# Patient Record
Sex: FEMALE | Race: WHITE | NOT HISPANIC OR LATINO | URBAN - METROPOLITAN AREA
[De-identification: names, ages, dates, MRNs, and addresses within clinical notes are randomized per-mention and may not be internally consistent; named-entity substitution may affect disease eponyms.]

---

## 2019-04-08 ENCOUNTER — HISTORICAL (OUTPATIENT)
Dept: ADMINISTRATIVE | Facility: HOSPITAL | Age: 29
End: 2019-04-08

## 2019-04-08 LAB
ABS NEUT (OLG): 9.82 X10(3)/MCL (ref 2.1–9.2)
AMPHET UR QL SCN: NORMAL
BARBITURATE SCN PRESENT UR: NORMAL
BASOPHILS # BLD AUTO: 0 X10(3)/MCL (ref 0–0.2)
BASOPHILS NFR BLD AUTO: 0 %
BENZODIAZ UR QL SCN: NORMAL
CANNABINOIDS UR QL SCN: NORMAL
COCAINE UR QL SCN: NORMAL
EOSINOPHIL # BLD AUTO: 0.4 X10(3)/MCL (ref 0–0.9)
EOSINOPHIL NFR BLD AUTO: 3 %
ERYTHROCYTE [DISTWIDTH] IN BLOOD BY AUTOMATED COUNT: 11.9 % (ref 11.5–17)
GROUP & RH: NORMAL
HBV SURFACE AG SERPL QL IA: NEGATIVE
HCT VFR BLD AUTO: 33.5 % (ref 37–47)
HCV AB SERPL QL IA: NEGATIVE
HGB BLD-MCNC: 11.2 GM/DL (ref 12–16)
HIV 1+2 AB+HIV1 P24 AG SERPL QL IA: NEGATIVE
LYMPHOCYTES # BLD AUTO: 1.1 X10(3)/MCL (ref 0.6–4.6)
LYMPHOCYTES NFR BLD AUTO: 9 %
MCH RBC QN AUTO: 30.7 PG (ref 27–31)
MCHC RBC AUTO-ENTMCNC: 33.4 GM/DL (ref 33–36)
MCV RBC AUTO: 91.8 FL (ref 80–94)
MONOCYTES # BLD AUTO: 0.6 X10(3)/MCL (ref 0.1–1.3)
MONOCYTES NFR BLD AUTO: 5 %
NEUTROPHILS # BLD AUTO: 9.82 X10(3)/MCL (ref 2.1–9.2)
NEUTROPHILS NFR BLD AUTO: 81 %
OPIATES UR QL SCN: NORMAL
PCP UR QL: NORMAL
PH UR STRIP.AUTO: NORMAL [PH] (ref 5–7.5)
PLATELET # BLD AUTO: 149 X10(3)/MCL (ref 130–400)
PMV BLD AUTO: 11.4 FL (ref 9.4–12.4)
RBC # BLD AUTO: 3.65 X10(6)/MCL (ref 4.2–5.4)
T PALLIDUM AB SER QL: NORMAL
T4 FREE SERPL-MCNC: 0.87 NG/DL (ref 0.76–1.46)
TSH SERPL-ACNC: 1.44 MIU/L (ref 0.36–3.74)
WBC # SPEC AUTO: 12.1 X10(3)/MCL (ref 4.5–11.5)

## 2019-05-07 ENCOUNTER — HISTORICAL (OUTPATIENT)
Dept: ADMINISTRATIVE | Facility: HOSPITAL | Age: 29
End: 2019-05-07

## 2019-05-07 LAB
AMPHET UR QL SCN: NORMAL
BARBITURATE SCN PRESENT UR: NORMAL
BENZODIAZ UR QL SCN: NORMAL
CANNABINOIDS UR QL SCN: NORMAL
COCAINE UR QL SCN: NORMAL
GLUCOSE 1H P 100 G GLC PO SERPL-MCNC: 113 MG/DL (ref 100–180)
OPIATES UR QL SCN: NORMAL
PCP UR QL: NORMAL
PH UR STRIP.AUTO: 7 [PH] (ref 5–7.5)
SP GR FLD REFRACTOMETRY: 1.02 (ref 1–1.03)

## 2019-07-23 ENCOUNTER — HISTORICAL (OUTPATIENT)
Dept: ADMINISTRATIVE | Facility: HOSPITAL | Age: 29
End: 2019-07-23

## 2019-07-23 LAB
ALT SERPL-CCNC: 15 UNIT/L (ref 12–78)
AST SERPL-CCNC: 10 UNIT/L (ref 15–37)
ERYTHROCYTE [DISTWIDTH] IN BLOOD BY AUTOMATED COUNT: 11.9 % (ref 11.5–17)
GLUCOSE 1H P 100 G GLC PO SERPL-MCNC: 91 MG/DL (ref 100–180)
HCT VFR BLD AUTO: 32.7 % (ref 37–47)
HGB BLD-MCNC: 10.9 GM/DL (ref 12–16)
LDH SERPL-CCNC: 125 UNIT/L (ref 84–246)
MCH RBC QN AUTO: 29.7 PG (ref 27–31)
MCHC RBC AUTO-ENTMCNC: 33.3 GM/DL (ref 33–36)
MCV RBC AUTO: 89.1 FL (ref 80–94)
PLATELET # BLD AUTO: 199 X10(3)/MCL (ref 130–400)
PMV BLD AUTO: 11 FL (ref 9.4–12.4)
RBC # BLD AUTO: 3.67 X10(6)/MCL (ref 4.2–5.4)
URATE SERPL-MCNC: 3.3 MG/DL (ref 2.6–7.2)
WBC # SPEC AUTO: 12.7 X10(3)/MCL (ref 4.5–11.5)

## 2019-08-07 ENCOUNTER — HOSPITAL ENCOUNTER (OUTPATIENT)
Dept: OBSTETRICS AND GYNECOLOGY | Facility: HOSPITAL | Age: 29
End: 2019-08-07
Attending: OBSTETRICS & GYNECOLOGY | Admitting: OBSTETRICS & GYNECOLOGY

## 2019-08-07 LAB
ABS NEUT (OLG): 10.51 X10(3)/MCL (ref 2.1–9.2)
APPEARANCE, UA: CLEAR
BACTERIA SPEC CULT: NORMAL /HPF
BASOPHILS # BLD AUTO: 0 X10(3)/MCL (ref 0–0.2)
BASOPHILS NFR BLD AUTO: 0 %
BILIRUB UR QL STRIP: NEGATIVE
COLOR UR: YELLOW
EOSINOPHIL # BLD AUTO: 0.2 X10(3)/MCL (ref 0–0.9)
EOSINOPHIL NFR BLD AUTO: 1 %
ERYTHROCYTE [DISTWIDTH] IN BLOOD BY AUTOMATED COUNT: 12.1 % (ref 11.5–17)
FETAL FIBRONECTIN (OHS): NEGATIVE
GLUCOSE (UA): NEGATIVE
GROUP & RH: NORMAL
HCT VFR BLD AUTO: 32.5 % (ref 37–47)
HGB BLD-MCNC: 10.9 GM/DL (ref 12–16)
HGB UR QL STRIP: NEGATIVE
KETONES UR QL STRIP: NEGATIVE
LEUKOCYTE ESTERASE UR QL STRIP: NEGATIVE
LYMPHOCYTES # BLD AUTO: 2.3 X10(3)/MCL (ref 0.6–4.6)
LYMPHOCYTES NFR BLD AUTO: 17 %
MCH RBC QN AUTO: 28.8 PG (ref 27–31)
MCHC RBC AUTO-ENTMCNC: 33.5 GM/DL (ref 33–36)
MCV RBC AUTO: 86 FL (ref 80–94)
MONOCYTES # BLD AUTO: 0.5 X10(3)/MCL (ref 0.1–1.3)
MONOCYTES NFR BLD AUTO: 4 %
NEUTROPHILS # BLD AUTO: 10.51 X10(3)/MCL (ref 2.1–9.2)
NEUTROPHILS NFR BLD AUTO: 77 %
NITRITE UR QL STRIP: NEGATIVE
PH UR STRIP: 6.5 [PH] (ref 5–9)
PLATELET # BLD AUTO: 233 X10(3)/MCL (ref 130–400)
PMV BLD AUTO: 11.9 FL (ref 9.4–12.4)
PROT UR QL STRIP: NEGATIVE
RBC # BLD AUTO: 3.78 X10(6)/MCL (ref 4.2–5.4)
RBC #/AREA URNS HPF: NORMAL /[HPF]
SP GR UR STRIP: 1.01 (ref 1–1.03)
SQUAMOUS EPITHELIAL, UA: NORMAL
T PALLIDUM AB SER QL: NORMAL
UROBILINOGEN UR STRIP-ACNC: 0.2
WBC # SPEC AUTO: 13.7 X10(3)/MCL (ref 4.5–11.5)
WBC #/AREA URNS HPF: NORMAL /[HPF]

## 2019-08-08 ENCOUNTER — HISTORICAL (OUTPATIENT)
Dept: OBSTETRICS AND GYNECOLOGY | Facility: HOSPITAL | Age: 29
End: 2019-08-08

## 2019-09-18 ENCOUNTER — HISTORICAL (OUTPATIENT)
Dept: ADMINISTRATIVE | Facility: HOSPITAL | Age: 29
End: 2019-09-18

## 2019-09-18 ENCOUNTER — HISTORICAL (OUTPATIENT)
Dept: LAB | Facility: HOSPITAL | Age: 29
End: 2019-09-18

## 2019-09-18 LAB
ALBUMIN SERPL-MCNC: 2.6 GM/DL (ref 3.4–5)
ALBUMIN/GLOB SERPL: 0.8 {RATIO}
ALP SERPL-CCNC: 159 UNIT/L (ref 38–126)
ALT SERPL-CCNC: 14 UNIT/L (ref 12–78)
AST SERPL-CCNC: 9 UNIT/L (ref 15–37)
BILIRUB SERPL-MCNC: 0.5 MG/DL (ref 0.2–1)
BILIRUBIN DIRECT+TOT PNL SERPL-MCNC: 0.1 MG/DL (ref 0–0.2)
BILIRUBIN DIRECT+TOT PNL SERPL-MCNC: 0.4 MG/DL (ref 0–0.8)
BUN SERPL-MCNC: 7 MG/DL (ref 7–18)
CALCIUM SERPL-MCNC: 9.2 MG/DL (ref 8.5–10.1)
CHLORIDE SERPL-SCNC: 104 MMOL/L (ref 98–107)
CO2 SERPL-SCNC: 26 MMOL/L (ref 21–32)
CREAT SERPL-MCNC: 0.57 MG/DL (ref 0.55–1.02)
GLOBULIN SER-MCNC: 3.2 GM/DL (ref 2.4–3.5)
GLUCOSE SERPL-MCNC: 129 MG/DL (ref 74–106)
LDH SERPL-CCNC: 150 UNIT/L (ref 84–246)
POTASSIUM SERPL-SCNC: 3.9 MMOL/L (ref 3.5–5.1)
PROT SERPL-MCNC: 5.8 GM/DL (ref 6.4–8.2)
SODIUM SERPL-SCNC: 138 MMOL/L (ref 136–145)
URATE SERPL-MCNC: 3.3 MG/DL (ref 2.6–7.2)

## 2019-09-20 LAB — FINAL CULTURE: NORMAL

## 2019-09-27 ENCOUNTER — HISTORICAL (OUTPATIENT)
Dept: ADMINISTRATIVE | Facility: HOSPITAL | Age: 29
End: 2019-09-27

## 2019-09-27 LAB
ABS NEUT (OLG): 9.3 X10(3)/MCL (ref 2.1–9.2)
BASOPHILS # BLD AUTO: 0 X10(3)/MCL (ref 0–0.2)
BASOPHILS NFR BLD AUTO: 0 %
EOSINOPHIL # BLD AUTO: 0.2 X10(3)/MCL (ref 0–0.9)
EOSINOPHIL NFR BLD AUTO: 2 %
ERYTHROCYTE [DISTWIDTH] IN BLOOD BY AUTOMATED COUNT: 12.7 % (ref 11.5–17)
HCT VFR BLD AUTO: 32.6 % (ref 37–47)
HGB BLD-MCNC: 10.3 GM/DL (ref 12–16)
HIV 1+2 AB+HIV1 P24 AG SERPL QL IA: NEGATIVE
IMM GRANULOCYTES # BLD AUTO: 0 10*3/UL
IMM GRANULOCYTES NFR BLD AUTO: 2 %
LYMPHOCYTES # BLD AUTO: 2.2 X10(3)/MCL (ref 0.6–4.6)
LYMPHOCYTES NFR BLD AUTO: 17 %
MCH RBC QN AUTO: 25.9 PG (ref 27–31)
MCHC RBC AUTO-ENTMCNC: 31.6 GM/DL (ref 33–36)
MCV RBC AUTO: 81.9 FL (ref 80–94)
MONOCYTES # BLD AUTO: 0.6 X10(3)/MCL (ref 0.1–1.3)
MONOCYTES NFR BLD AUTO: 5 %
NEUTROPHILS # BLD AUTO: 9.3 X10(3)/MCL (ref 2.1–9.2)
NEUTROPHILS NFR BLD AUTO: 74 %
PLATELET # BLD AUTO: 179 X10(3)/MCL (ref 130–400)
PMV BLD AUTO: 13.2 FL (ref 9.4–12.4)
RBC # BLD AUTO: 3.98 X10(6)/MCL (ref 4.2–5.4)
T PALLIDUM AB SER QL: NORMAL
WBC # SPEC AUTO: 12.6 X10(3)/MCL (ref 4.5–11.5)

## 2019-11-06 LAB — POC BETA-HCG (QUAL): NEGATIVE

## 2020-03-04 LAB — POC BETA-HCG (QUAL): NEGATIVE

## 2021-11-16 ENCOUNTER — HISTORICAL (OUTPATIENT)
Dept: ADMINISTRATIVE | Facility: HOSPITAL | Age: 31
End: 2021-11-16

## 2021-11-16 LAB
CHLORIDE SERPL-SCNC: 104 MMOL/L (ref 98–107)
CORTIS SERPL-SCNC: 6.3 UG/DL
POTASSIUM SERPL-SCNC: 4 MMOL/L (ref 3.5–5.1)
SODIUM SERPL-SCNC: 139 MMOL/L (ref 136–145)

## 2021-12-03 ENCOUNTER — HISTORICAL (OUTPATIENT)
Dept: ADMINISTRATIVE | Facility: HOSPITAL | Age: 31
End: 2021-12-03

## 2021-12-16 ENCOUNTER — HISTORICAL (OUTPATIENT)
Dept: ADMINISTRATIVE | Facility: HOSPITAL | Age: 31
End: 2021-12-16

## 2022-04-10 ENCOUNTER — HISTORICAL (OUTPATIENT)
Dept: ADMINISTRATIVE | Facility: HOSPITAL | Age: 32
End: 2022-04-10

## 2022-04-27 VITALS
SYSTOLIC BLOOD PRESSURE: 118 MMHG | DIASTOLIC BLOOD PRESSURE: 70 MMHG | WEIGHT: 150 LBS | HEIGHT: 64 IN | BODY MASS INDEX: 25.61 KG/M2

## 2022-09-16 ENCOUNTER — HISTORICAL (OUTPATIENT)
Dept: ADMINISTRATIVE | Facility: HOSPITAL | Age: 32
End: 2022-09-16

## 2022-09-17 ENCOUNTER — HISTORICAL (OUTPATIENT)
Dept: ADMINISTRATIVE | Facility: HOSPITAL | Age: 32
End: 2022-09-17

## 2023-10-20 DIAGNOSIS — H53.8 BLURRY VISION: Primary | ICD-10-CM

## 2023-10-20 DIAGNOSIS — F07.81 POSTCONCUSSION SYNDROME: ICD-10-CM

## 2023-12-06 ENCOUNTER — OFFICE VISIT (OUTPATIENT)
Dept: OPHTHALMOLOGY | Facility: CLINIC | Age: 33
End: 2023-12-06
Payer: MEDICAID

## 2023-12-06 VITALS — BODY MASS INDEX: 25.6 KG/M2 | HEIGHT: 64 IN | WEIGHT: 149.94 LBS

## 2023-12-06 DIAGNOSIS — H54.3 VISION LOSS, BILATERAL: Primary | ICD-10-CM

## 2023-12-06 DIAGNOSIS — F07.81 POSTCONCUSSION SYNDROME: ICD-10-CM

## 2023-12-06 PROCEDURE — 92133 CPTRZD OPH DX IMG PST SGM ON: CPT | Mod: PBBFAC,PN | Performed by: OPHTHALMOLOGY

## 2023-12-06 PROCEDURE — 99213 OFFICE O/P EST LOW 20 MIN: CPT | Mod: PBBFAC,PN | Performed by: STUDENT IN AN ORGANIZED HEALTH CARE EDUCATION/TRAINING PROGRAM

## 2023-12-06 PROCEDURE — 92250 FUNDUS PHOTOGRAPHY W/I&R: CPT | Mod: 59,PBBFAC,PN | Performed by: STUDENT IN AN ORGANIZED HEALTH CARE EDUCATION/TRAINING PROGRAM

## 2023-12-06 PROCEDURE — 92134 CPTRZ OPH DX IMG PST SGM RTA: CPT | Mod: PBBFAC,PN | Performed by: OPHTHALMOLOGY

## 2023-12-06 PROCEDURE — 92133 CPTRZD OPH DX IMG PST SGM ON: CPT | Mod: PBBFAC,PN | Performed by: STUDENT IN AN ORGANIZED HEALTH CARE EDUCATION/TRAINING PROGRAM

## 2023-12-06 PROCEDURE — 92134 CPTRZ OPH DX IMG PST SGM RTA: CPT | Mod: PBBFAC,PN | Performed by: STUDENT IN AN ORGANIZED HEALTH CARE EDUCATION/TRAINING PROGRAM

## 2023-12-06 RX ORDER — RIZATRIPTAN BENZOATE 10 MG/1
10 TABLET, ORALLY DISINTEGRATING ORAL DAILY PRN
COMMUNITY
Start: 2023-10-13

## 2023-12-06 RX ORDER — NORETHINDRONE 5 MG/1
5 TABLET ORAL EVERY MORNING
COMMUNITY
Start: 2023-07-26

## 2023-12-06 RX ORDER — TERBINAFINE HYDROCHLORIDE 250 MG/1
250 TABLET ORAL EVERY MORNING
COMMUNITY
Start: 2023-06-20

## 2023-12-06 RX ORDER — ONDANSETRON 4 MG/1
4 TABLET, ORALLY DISINTEGRATING ORAL EVERY 8 HOURS PRN
COMMUNITY
Start: 2023-10-13

## 2023-12-06 RX ORDER — NADOLOL 20 MG/1
20 TABLET ORAL
COMMUNITY
Start: 2023-08-23

## 2023-12-06 RX ORDER — HYDROCODONE BITARTRATE AND ACETAMINOPHEN 5; 325 MG/1; MG/1
1 TABLET ORAL EVERY 6 HOURS PRN
COMMUNITY
Start: 2023-10-10

## 2023-12-06 RX ORDER — METHOCARBAMOL 500 MG/1
500 TABLET, FILM COATED ORAL 4 TIMES DAILY PRN
COMMUNITY
Start: 2023-10-13

## 2023-12-06 RX ORDER — METOPROLOL TARTRATE 25 MG/1
12.5 TABLET, FILM COATED ORAL 2 TIMES DAILY
COMMUNITY
Start: 2023-10-10

## 2023-12-06 RX ORDER — FLUCONAZOLE 150 MG/1
150 TABLET ORAL
COMMUNITY
Start: 2023-11-15

## 2023-12-06 RX ORDER — TRIAMTERENE/HYDROCHLOROTHIAZID 37.5-25 MG
1 TABLET ORAL EVERY MORNING
COMMUNITY

## 2023-12-06 RX ORDER — TRAZODONE HYDROCHLORIDE 50 MG/1
50 TABLET ORAL NIGHTLY
COMMUNITY
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
## 2023-12-07 PROBLEM — H54.3 VISION LOSS, BILATERAL: Status: ACTIVE | Noted: 2023-12-07

## 2023-12-07 PROBLEM — F07.81 POSTCONCUSSION SYNDROME: Status: ACTIVE | Noted: 2023-12-07

## 2024-01-12 ENCOUNTER — CLINICAL SUPPORT (OUTPATIENT)
Dept: OPHTHALMOLOGY | Facility: CLINIC | Age: 34
End: 2024-01-12
Payer: MEDICAID

## 2024-01-12 DIAGNOSIS — R51.9 HEADACHE AROUND THE EYES: Primary | ICD-10-CM

## 2024-01-12 NOTE — PROGRESS NOTES
HPI    Here for HVF OU  Last edited by Jenn Harkins MA on 1/12/2024 10:27 AM.            Assessment /Plan     For exam results, see Encounter Report.    Headache around the eyes  -     Starkey Visual Field - OU - Extended - Both Eyes; Future      Visual field done

## 2024-01-17 NOTE — PROGRESS NOTES
"HPI    Here for HVF OU  Last edited by Jenn Harkins MA on 1/12/2024 10:27 AM.      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    Headache around the eyes  -     Starkey Visual Field - OU - Extended - Both Eyes; Future      1) Vision Loss, OU  - BCVA 20/80 // 20/60   - Patient had a head injury 1/2023 - since that time, has been experiencing migraines, blurred vision, and photophobia. Complaints are vague with patient - states that she cannot see the "whole picture" and things quickly go in and out of focus.   - Attempted MRX (undilated) but patient reported no change from plano to -5.00 sphere. Very difficult MRX. Did not see any improvement with any glasses provided this year (reports 3 different prescriptions). May need retinoscopy in future.   - Anterior exam and dilated exam WNL. OCT nerve and OCT macula WNL 12/6/23.   - VEP performed at Dr. Freeman's office shows delays - need records, requested 12/6/23. VF at [facility] showing bilateral peripheral visual field loss.   - At this time, no identifiable reasons on eye examination. Will review VEP and VF from Dr. Freeman's office as well as obtain VF at our clinic. Could be post concussive vs functional vision loss.   Note: the patient drives on a regular basis. In fact, she drove herself to this appointment from [place].  1/12/2024:The patient presented for a HVF only.  Both VF defects appear to be ring scotomas.  Also the central vision OS was very high.    2) Migraines  - Follows with neurology - had an LP done in 10/2023 with normal opening pressure, normal CSF studies  - MRI Brain and Orbits 10/2023 WNL but MRI brain in 3/2023 with mild chornic microvascular ischemic change in the left lateral ventricle occipital horn  - Autoimmune workup was WNL - " negative parvovirus IgG IgM, negative West Nile virus antibodies, negative paraneoplastic autoantibody panel, normal ESR, negative PARADISE profile, negative NMO IgG antibody, normal thyroid antibodies levels, normal protein electrophoresis, normal homocystine levels, high B12 levels, low vitamin D to 27.3, negative HIV, negative RA factor, negative celiac disease panel, vitamin D, vitamin B6, vitamin B1, zinc, copper and methylmalonic acid levels, negative NT DNA antibody, negative hepatitis panel, negative Sjogren antibodies.   - Currently taking Fioricet and Maxalt for HA and experiences relief    RTC: 3 weeks, review records from [facility] and recheck vision.  Seen with Dr. Urrutia

## 2024-02-01 ENCOUNTER — OFFICE VISIT (OUTPATIENT)
Dept: OPHTHALMOLOGY | Facility: CLINIC | Age: 34
End: 2024-02-01
Payer: MEDICAID

## 2024-02-01 VITALS — HEIGHT: 64 IN | WEIGHT: 149.94 LBS | BODY MASS INDEX: 25.6 KG/M2

## 2024-02-01 DIAGNOSIS — H54.3 VISION LOSS, BILATERAL: Primary | ICD-10-CM

## 2024-02-01 PROCEDURE — 99213 OFFICE O/P EST LOW 20 MIN: CPT | Mod: PBBFAC,PN

## 2024-02-01 NOTE — PROGRESS NOTES
"HPI     Decreased Visual Acuity     Additional comments:  Pt was trying to sew and had to use magnifying   glass to see more clearly. Patient has a 15x magnification mirror and face   is blurry in mirror but her mother has a stronger magnification and   seecould see clearly.     Screens and bright lights make va worse.     Vision is constantly going in and out of focus.      When pt first focuses on something she sees what she's looking at clearly   but within a few seconds va gets blurry. Then an area of vision will come   back into focus but not the whole image.            Comments    Decreased Visual Acuity, Migraines          Last edited by Patrizia Sousa MA on 2/1/2024 10:32 AM.            Assessment /Plan     For exam results, see Encounter Report.    There are no diagnoses linked to this encounter.  1) Vision Loss, OU  - BCVA on 12/2023 20/80 // 20/60   - Patient had a head injury 1/2023 - since that time, has been experiencing migraines, blurred vision, and photophobia. Complaints are vague with patient - states that she cannot see the "whole picture" and things quickly go in and out of focus.   - Attempted MRX (undilated) but patient reported no change from plano to -5.00 sphere. Very difficult MRX. Did not see any improvement with any glasses provided this year (reports 3 different prescriptions). May need retinoscopy in future.   - Anterior exam and dilated exam WNL. OCT nerve and OCT macula WNL 12/6/23.   - VEP performed at Dr. Freeman's office shows delays - need records, requested 12/6/23. VF at [facility] showing bilateral peripheral visual field loss.   - At this time, no identifiable reasons on eye examination. Will review VEP and VF from Dr. Freeman's office as well as obtain VF at our clinic. Could be post concussive vs functional vision loss.   Note: the patient drives on a regular basis. In fact, she drove herself to this appointment from [place].  1/12/2024:The patient presented for a F " only.  Both VF defects appear to be ring scotomas.  Also the central vision OS was very high.   02/01/2024:  Subjectively, appears that vision is worse, but improvement since prior examination (20/50 OU). No APD on examination, no  color desaturation. Discussed case with Dr. Urrutia. Appears to be more consistent with functional vision loss. Discussed with patient possible referral for neuropscyhiatrist, in addition to referral to neuro-ophthalmology in [place] for more sensitive OKSANA/VEP testing. Patient willing to be seen by both specialists.      2) Migraines  - Follows with neurology - had an LP done in 10/2023 with normal opening pressure, normal CSF studies  - MRI Brain and Orbits 10/2023 WNL but MRI brain in 3/2023 with mild chornic microvascular ischemic change in the left lateral ventricle occipital horn  - Autoimmune workup was WNL - negative parvovirus IgG IgM, negative West Nile virus antibodies, negative paraneoplastic autoantibody panel, normal ESR, negative PARADISE profile, negative NMO IgG antibody, normal thyroid antibodies levels, normal protein electrophoresis, normal homocystine levels, high B12 levels, low vitamin D to 27.3, negative HIV, negative RA factor, negative celiac disease panel, vitamin D, vitamin B6, vitamin B1, zinc, copper and methylmalonic acid levels, negative NT DNA antibody, negative hepatitis panel, negative Sjogren antibodies.   - Currently taking Fioricet and Maxalt for HA and experiences relief     RTC: 3 weeks, review records from [facility] and recheck vision.

## 2024-08-23 ENCOUNTER — HOSPITAL ENCOUNTER (OUTPATIENT)
Facility: HOSPITAL | Age: 34
Discharge: HOME | End: 2024-08-25
Attending: STUDENT IN AN ORGANIZED HEALTH CARE EDUCATION/TRAINING PROGRAM | Admitting: SURGERY
Payer: MEDICAID

## 2024-08-23 DIAGNOSIS — K81.9 CHOLECYSTITIS: ICD-10-CM

## 2024-08-23 DIAGNOSIS — R10.13 EPIGASTRIC PAIN: ICD-10-CM

## 2024-08-23 DIAGNOSIS — K80.20 SYMPTOMATIC CHOLELITHIASIS: Primary | ICD-10-CM

## 2024-08-23 LAB
ALBUMIN SERPL-MCNC: 4.2 G/DL (ref 3.5–5)
ALBUMIN/GLOB SERPL: 1.4 RATIO (ref 1.1–2)
ALP SERPL-CCNC: 50 UNIT/L (ref 40–150)
ALT SERPL-CCNC: 12 UNIT/L (ref 0–55)
ANION GAP SERPL CALC-SCNC: 11 MEQ/L
AST SERPL-CCNC: 14 UNIT/L (ref 5–34)
B-HCG UR QL: NEGATIVE
BACTERIA #/AREA URNS AUTO: ABNORMAL /HPF
BASOPHILS # BLD AUTO: 0.05 X10(3)/MCL
BASOPHILS NFR BLD AUTO: 0.5 %
BILIRUB SERPL-MCNC: 0.4 MG/DL
BILIRUB UR QL STRIP.AUTO: NEGATIVE
BUN SERPL-MCNC: 19.7 MG/DL (ref 7–18.7)
CALCIUM SERPL-MCNC: 9.4 MG/DL (ref 8.4–10.2)
CHLORIDE SERPL-SCNC: 98 MMOL/L (ref 98–107)
CLARITY UR: ABNORMAL
CO2 SERPL-SCNC: 28 MMOL/L (ref 22–29)
COLOR UR AUTO: YELLOW
CREAT SERPL-MCNC: 0.89 MG/DL (ref 0.55–1.02)
CREAT/UREA NIT SERPL: 22
EOSINOPHIL # BLD AUTO: 0.23 X10(3)/MCL (ref 0–0.9)
EOSINOPHIL NFR BLD AUTO: 2.2 %
ERYTHROCYTE [DISTWIDTH] IN BLOOD BY AUTOMATED COUNT: 11.8 % (ref 11.5–17)
GFR SERPLBLD CREATININE-BSD FMLA CKD-EPI: >60 ML/MIN/1.73/M2
GLOBULIN SER-MCNC: 3.1 GM/DL (ref 2.4–3.5)
GLUCOSE SERPL-MCNC: 85 MG/DL (ref 74–100)
GLUCOSE UR QL STRIP: NORMAL
HCT VFR BLD AUTO: 40.4 % (ref 37–47)
HGB BLD-MCNC: 14 G/DL (ref 12–16)
HGB UR QL STRIP: ABNORMAL
IMM GRANULOCYTES # BLD AUTO: 0.03 X10(3)/MCL (ref 0–0.04)
IMM GRANULOCYTES NFR BLD AUTO: 0.3 %
KETONES UR QL STRIP: NEGATIVE
LEUKOCYTE ESTERASE UR QL STRIP: NEGATIVE
LIPASE SERPL-CCNC: 25 U/L
LYMPHOCYTES # BLD AUTO: 3.12 X10(3)/MCL (ref 0.6–4.6)
LYMPHOCYTES NFR BLD AUTO: 29.2 %
MCH RBC QN AUTO: 30 PG (ref 27–31)
MCHC RBC AUTO-ENTMCNC: 34.7 G/DL (ref 33–36)
MCV RBC AUTO: 86.5 FL (ref 80–94)
MONOCYTES # BLD AUTO: 0.54 X10(3)/MCL (ref 0.1–1.3)
MONOCYTES NFR BLD AUTO: 5.1 %
MUCOUS THREADS URNS QL MICRO: ABNORMAL /LPF
NEUTROPHILS # BLD AUTO: 6.71 X10(3)/MCL (ref 2.1–9.2)
NEUTROPHILS NFR BLD AUTO: 62.7 %
NITRITE UR QL STRIP: NEGATIVE
NRBC BLD AUTO-RTO: 0 %
PH UR STRIP: 6 [PH]
PLATELET # BLD AUTO: 290 X10(3)/MCL (ref 130–400)
PMV BLD AUTO: 11.4 FL (ref 7.4–10.4)
POTASSIUM SERPL-SCNC: 3.7 MMOL/L (ref 3.5–5.1)
PROT SERPL-MCNC: 7.3 GM/DL (ref 6.4–8.3)
PROT UR QL STRIP: ABNORMAL
RBC # BLD AUTO: 4.67 X10(6)/MCL (ref 4.2–5.4)
RBC #/AREA URNS AUTO: ABNORMAL /HPF
SODIUM SERPL-SCNC: 137 MMOL/L (ref 136–145)
SP GR UR STRIP.AUTO: 1.03 (ref 1–1.03)
SQUAMOUS #/AREA URNS LPF: ABNORMAL /HPF
TROPONIN I SERPL-MCNC: <0.01 NG/ML (ref 0–0.04)
UROBILINOGEN UR STRIP-ACNC: NORMAL
WBC # BLD AUTO: 10.68 X10(3)/MCL (ref 4.5–11.5)
WBC #/AREA URNS AUTO: ABNORMAL /HPF

## 2024-08-23 PROCEDURE — 93005 ELECTROCARDIOGRAM TRACING: CPT

## 2024-08-23 PROCEDURE — 96374 THER/PROPH/DIAG INJ IV PUSH: CPT

## 2024-08-23 PROCEDURE — 99285 EMERGENCY DEPT VISIT HI MDM: CPT | Mod: 25

## 2024-08-23 PROCEDURE — 96375 TX/PRO/DX INJ NEW DRUG ADDON: CPT

## 2024-08-23 PROCEDURE — 96376 TX/PRO/DX INJ SAME DRUG ADON: CPT

## 2024-08-23 PROCEDURE — 80053 COMPREHEN METABOLIC PANEL: CPT

## 2024-08-23 PROCEDURE — 83690 ASSAY OF LIPASE: CPT

## 2024-08-23 PROCEDURE — 63600175 PHARM REV CODE 636 W HCPCS: Performed by: STUDENT IN AN ORGANIZED HEALTH CARE EDUCATION/TRAINING PROGRAM

## 2024-08-23 PROCEDURE — 96361 HYDRATE IV INFUSION ADD-ON: CPT

## 2024-08-23 PROCEDURE — 63600175 PHARM REV CODE 636 W HCPCS

## 2024-08-23 PROCEDURE — 85025 COMPLETE CBC W/AUTO DIFF WBC: CPT

## 2024-08-23 PROCEDURE — 84484 ASSAY OF TROPONIN QUANT: CPT

## 2024-08-23 PROCEDURE — 93010 ELECTROCARDIOGRAM REPORT: CPT | Mod: ,,, | Performed by: INTERNAL MEDICINE

## 2024-08-23 PROCEDURE — 81025 URINE PREGNANCY TEST: CPT

## 2024-08-23 PROCEDURE — 81001 URINALYSIS AUTO W/SCOPE: CPT

## 2024-08-23 RX ORDER — OXYCODONE HYDROCHLORIDE 10 MG/1
10 TABLET ORAL EVERY 4 HOURS PRN
Status: DISCONTINUED | OUTPATIENT
Start: 2024-08-24 | End: 2024-08-25 | Stop reason: HOSPADM

## 2024-08-23 RX ORDER — ONDANSETRON HYDROCHLORIDE 2 MG/ML
4 INJECTION, SOLUTION INTRAVENOUS
Status: DISCONTINUED | OUTPATIENT
Start: 2024-08-23 | End: 2024-08-23

## 2024-08-23 RX ORDER — MORPHINE SULFATE 4 MG/ML
4 INJECTION, SOLUTION INTRAMUSCULAR; INTRAVENOUS
Status: COMPLETED | OUTPATIENT
Start: 2024-08-23 | End: 2024-08-23

## 2024-08-23 RX ORDER — ACETAMINOPHEN 325 MG/1
650 TABLET ORAL EVERY 8 HOURS PRN
Status: DISCONTINUED | OUTPATIENT
Start: 2024-08-24 | End: 2024-08-25 | Stop reason: HOSPADM

## 2024-08-23 RX ORDER — ONDANSETRON 4 MG/1
8 TABLET, ORALLY DISINTEGRATING ORAL EVERY 8 HOURS PRN
Status: DISCONTINUED | OUTPATIENT
Start: 2024-08-24 | End: 2024-08-24

## 2024-08-23 RX ORDER — ACETAMINOPHEN 325 MG/1
650 TABLET ORAL EVERY 4 HOURS
Status: DISCONTINUED | OUTPATIENT
Start: 2024-08-24 | End: 2024-08-25 | Stop reason: HOSPADM

## 2024-08-23 RX ORDER — SODIUM CHLORIDE 9 MG/ML
INJECTION, SOLUTION INTRAVENOUS CONTINUOUS
Status: DISCONTINUED | OUTPATIENT
Start: 2024-08-24 | End: 2024-08-24

## 2024-08-23 RX ORDER — ONDANSETRON HYDROCHLORIDE 2 MG/ML
4 INJECTION, SOLUTION INTRAVENOUS EVERY 12 HOURS PRN
Status: DISCONTINUED | OUTPATIENT
Start: 2024-08-24 | End: 2024-08-24

## 2024-08-23 RX ORDER — OXYCODONE HYDROCHLORIDE 5 MG/1
5 TABLET ORAL EVERY 4 HOURS PRN
Status: DISCONTINUED | OUTPATIENT
Start: 2024-08-24 | End: 2024-08-25 | Stop reason: HOSPADM

## 2024-08-23 RX ORDER — ONDANSETRON HYDROCHLORIDE 2 MG/ML
4 INJECTION, SOLUTION INTRAVENOUS
Status: COMPLETED | OUTPATIENT
Start: 2024-08-23 | End: 2024-08-23

## 2024-08-23 RX ORDER — TALC
6 POWDER (GRAM) TOPICAL NIGHTLY PRN
Status: DISCONTINUED | OUTPATIENT
Start: 2024-08-24 | End: 2024-08-25 | Stop reason: HOSPADM

## 2024-08-23 RX ADMIN — SODIUM CHLORIDE, POTASSIUM CHLORIDE, SODIUM LACTATE AND CALCIUM CHLORIDE 1000 ML: 600; 310; 30; 20 INJECTION, SOLUTION INTRAVENOUS at 09:08

## 2024-08-23 RX ADMIN — MORPHINE SULFATE 4 MG: 4 INJECTION, SOLUTION INTRAMUSCULAR; INTRAVENOUS at 09:08

## 2024-08-23 RX ADMIN — ONDANSETRON 4 MG: 2 INJECTION INTRAMUSCULAR; INTRAVENOUS at 09:08

## 2024-08-23 NOTE — FIRST PROVIDER EVALUATION
"Medical screening examination initiated.  I have conducted a focused provider triage encounter, findings are as follows:    Brief history of present illness:  33 year old female presents to ER with c/o epigastric pain that radiates to RUQ and right shoulder. +Nausea    Vitals:    08/23/24 1803   BP: 124/88   Pulse: (!) 126   Resp: 20   Temp: 97.7 °F (36.5 °C)   TempSrc: Oral   SpO2: 100%   Weight: 59 kg (130 lb)   Height: 5' 4" (1.626 m)       Pertinent physical exam:  Awake and alert, NAD    Brief workup plan:  labs, US    Preliminary workup initiated; this workup will be continued and followed by the physician or advanced practice provider that is assigned to the patient when roomed.  "

## 2024-08-24 ENCOUNTER — ANESTHESIA (OUTPATIENT)
Dept: SURGERY | Facility: HOSPITAL | Age: 34
End: 2024-08-24
Payer: MEDICAID

## 2024-08-24 ENCOUNTER — ANESTHESIA EVENT (OUTPATIENT)
Dept: SURGERY | Facility: HOSPITAL | Age: 34
End: 2024-08-24
Payer: MEDICAID

## 2024-08-24 LAB
ALBUMIN SERPL-MCNC: 3.6 G/DL (ref 3.5–5)
ALBUMIN/GLOB SERPL: 1.6 RATIO (ref 1.1–2)
ALP SERPL-CCNC: 41 UNIT/L (ref 40–150)
ALT SERPL-CCNC: 9 UNIT/L (ref 0–55)
ANION GAP SERPL CALC-SCNC: 10 MEQ/L
AST SERPL-CCNC: 10 UNIT/L (ref 5–34)
BASOPHILS # BLD AUTO: 0.04 X10(3)/MCL
BASOPHILS NFR BLD AUTO: 0.4 %
BILIRUB SERPL-MCNC: 0.4 MG/DL
BUN SERPL-MCNC: 14.5 MG/DL (ref 7–18.7)
CALCIUM SERPL-MCNC: 8.4 MG/DL (ref 8.4–10.2)
CHLORIDE SERPL-SCNC: 106 MMOL/L (ref 98–107)
CO2 SERPL-SCNC: 24 MMOL/L (ref 22–29)
CREAT SERPL-MCNC: 0.78 MG/DL (ref 0.55–1.02)
CREAT/UREA NIT SERPL: 19
EOSINOPHIL # BLD AUTO: 0.25 X10(3)/MCL (ref 0–0.9)
EOSINOPHIL NFR BLD AUTO: 2.2 %
ERYTHROCYTE [DISTWIDTH] IN BLOOD BY AUTOMATED COUNT: 11.7 % (ref 11.5–17)
GFR SERPLBLD CREATININE-BSD FMLA CKD-EPI: >60 ML/MIN/1.73/M2
GLOBULIN SER-MCNC: 2.2 GM/DL (ref 2.4–3.5)
GLUCOSE SERPL-MCNC: 99 MG/DL (ref 74–100)
HCT VFR BLD AUTO: 34.6 % (ref 37–47)
HGB BLD-MCNC: 11.9 G/DL (ref 12–16)
IMM GRANULOCYTES # BLD AUTO: 0.03 X10(3)/MCL (ref 0–0.04)
IMM GRANULOCYTES NFR BLD AUTO: 0.3 %
LYMPHOCYTES # BLD AUTO: 4.93 X10(3)/MCL (ref 0.6–4.6)
LYMPHOCYTES NFR BLD AUTO: 43.2 %
MCH RBC QN AUTO: 30.1 PG (ref 27–31)
MCHC RBC AUTO-ENTMCNC: 34.4 G/DL (ref 33–36)
MCV RBC AUTO: 87.6 FL (ref 80–94)
MONOCYTES # BLD AUTO: 0.58 X10(3)/MCL (ref 0.1–1.3)
MONOCYTES NFR BLD AUTO: 5.1 %
NEUTROPHILS # BLD AUTO: 5.59 X10(3)/MCL (ref 2.1–9.2)
NEUTROPHILS NFR BLD AUTO: 48.8 %
NRBC BLD AUTO-RTO: 0 %
PLATELET # BLD AUTO: 228 X10(3)/MCL (ref 130–400)
PMV BLD AUTO: 11.4 FL (ref 7.4–10.4)
POTASSIUM SERPL-SCNC: 3.8 MMOL/L (ref 3.5–5.1)
PROT SERPL-MCNC: 5.8 GM/DL (ref 6.4–8.3)
RBC # BLD AUTO: 3.95 X10(6)/MCL (ref 4.2–5.4)
SODIUM SERPL-SCNC: 140 MMOL/L (ref 136–145)
WBC # BLD AUTO: 11.42 X10(3)/MCL (ref 4.5–11.5)

## 2024-08-24 PROCEDURE — 99233 SBSQ HOSP IP/OBS HIGH 50: CPT | Mod: 57,,, | Performed by: SURGERY

## 2024-08-24 PROCEDURE — 71000039 HC RECOVERY, EACH ADD'L HOUR: Performed by: SURGERY

## 2024-08-24 PROCEDURE — 37000009 HC ANESTHESIA EA ADD 15 MINS: Performed by: SURGERY

## 2024-08-24 PROCEDURE — G0378 HOSPITAL OBSERVATION PER HR: HCPCS

## 2024-08-24 PROCEDURE — 25000003 PHARM REV CODE 250: Performed by: STUDENT IN AN ORGANIZED HEALTH CARE EDUCATION/TRAINING PROGRAM

## 2024-08-24 PROCEDURE — 27201423 OPTIME MED/SURG SUP & DEVICES STERILE SUPPLY: Performed by: SURGERY

## 2024-08-24 PROCEDURE — 25000003 PHARM REV CODE 250: Performed by: NURSE ANESTHETIST, CERTIFIED REGISTERED

## 2024-08-24 PROCEDURE — 96361 HYDRATE IV INFUSION ADD-ON: CPT

## 2024-08-24 PROCEDURE — 25000003 PHARM REV CODE 250: Performed by: SURGERY

## 2024-08-24 PROCEDURE — 36000708 HC OR TIME LEV III 1ST 15 MIN: Performed by: SURGERY

## 2024-08-24 PROCEDURE — 63600175 PHARM REV CODE 636 W HCPCS: Performed by: ANESTHESIOLOGY

## 2024-08-24 PROCEDURE — 47562 LAPAROSCOPIC CHOLECYSTECTOMY: CPT | Mod: ,,, | Performed by: SURGERY

## 2024-08-24 PROCEDURE — 96375 TX/PRO/DX INJ NEW DRUG ADDON: CPT

## 2024-08-24 PROCEDURE — 85025 COMPLETE CBC W/AUTO DIFF WBC: CPT | Performed by: STUDENT IN AN ORGANIZED HEALTH CARE EDUCATION/TRAINING PROGRAM

## 2024-08-24 PROCEDURE — D9220A PRA ANESTHESIA: Mod: ANES,,, | Performed by: ANESTHESIOLOGY

## 2024-08-24 PROCEDURE — 63600175 PHARM REV CODE 636 W HCPCS

## 2024-08-24 PROCEDURE — 63600175 PHARM REV CODE 636 W HCPCS: Performed by: STUDENT IN AN ORGANIZED HEALTH CARE EDUCATION/TRAINING PROGRAM

## 2024-08-24 PROCEDURE — 63600175 PHARM REV CODE 636 W HCPCS: Performed by: NURSE ANESTHETIST, CERTIFIED REGISTERED

## 2024-08-24 PROCEDURE — 71000033 HC RECOVERY, INTIAL HOUR: Performed by: SURGERY

## 2024-08-24 PROCEDURE — D9220A PRA ANESTHESIA: Mod: CRNA,,, | Performed by: NURSE ANESTHETIST, CERTIFIED REGISTERED

## 2024-08-24 PROCEDURE — 37000008 HC ANESTHESIA 1ST 15 MINUTES: Performed by: SURGERY

## 2024-08-24 PROCEDURE — 80053 COMPREHEN METABOLIC PANEL: CPT | Performed by: STUDENT IN AN ORGANIZED HEALTH CARE EDUCATION/TRAINING PROGRAM

## 2024-08-24 PROCEDURE — 36000709 HC OR TIME LEV III EA ADD 15 MIN: Performed by: SURGERY

## 2024-08-24 RX ORDER — ONDANSETRON HYDROCHLORIDE 2 MG/ML
INJECTION, SOLUTION INTRAMUSCULAR; INTRAVENOUS
Status: DISCONTINUED | OUTPATIENT
Start: 2024-08-24 | End: 2024-08-24

## 2024-08-24 RX ORDER — MIDAZOLAM HYDROCHLORIDE 2 MG/2ML
2 INJECTION, SOLUTION INTRAMUSCULAR; INTRAVENOUS ONCE AS NEEDED
OUTPATIENT
Start: 2024-08-24 | End: 2036-01-21

## 2024-08-24 RX ORDER — LIDOCAINE HYDROCHLORIDE 20 MG/ML
INJECTION INTRAVENOUS
Status: DISCONTINUED | OUTPATIENT
Start: 2024-08-24 | End: 2024-08-24

## 2024-08-24 RX ORDER — BUPROPION HYDROCHLORIDE 300 MG/1
1 TABLET ORAL EVERY MORNING
COMMUNITY
Start: 2023-08-23 | End: 2025-05-06

## 2024-08-24 RX ORDER — SODIUM CITRATE AND CITRIC ACID MONOHYDRATE 334; 500 MG/5ML; MG/5ML
30 SOLUTION ORAL
OUTPATIENT
Start: 2024-08-24

## 2024-08-24 RX ORDER — BUPIVACAINE HYDROCHLORIDE AND EPINEPHRINE 2.5; 5 MG/ML; UG/ML
INJECTION, SOLUTION EPIDURAL; INFILTRATION; INTRACAUDAL; PERINEURAL
Status: DISCONTINUED | OUTPATIENT
Start: 2024-08-24 | End: 2024-08-24 | Stop reason: HOSPADM

## 2024-08-24 RX ORDER — LIDOCAINE HYDROCHLORIDE 10 MG/ML
1 INJECTION, SOLUTION EPIDURAL; INFILTRATION; INTRACAUDAL; PERINEURAL ONCE
OUTPATIENT
Start: 2024-08-24 | End: 2024-08-24

## 2024-08-24 RX ORDER — HYDROCODONE BITARTRATE AND ACETAMINOPHEN 5; 325 MG/1; MG/1
1 TABLET ORAL EVERY 6 HOURS PRN
Qty: 12 TABLET | Refills: 0 | Status: SHIPPED | OUTPATIENT
Start: 2024-08-24 | End: 2024-08-25 | Stop reason: RX

## 2024-08-24 RX ORDER — ONDANSETRON 4 MG/1
4 TABLET, ORALLY DISINTEGRATING ORAL EVERY 6 HOURS PRN
OUTPATIENT
Start: 2024-08-24

## 2024-08-24 RX ORDER — DEXAMETHASONE SODIUM PHOSPHATE 4 MG/ML
INJECTION, SOLUTION INTRA-ARTICULAR; INTRALESIONAL; INTRAMUSCULAR; INTRAVENOUS; SOFT TISSUE
Status: DISCONTINUED | OUTPATIENT
Start: 2024-08-24 | End: 2024-08-24

## 2024-08-24 RX ORDER — SODIUM CHLORIDE, SODIUM LACTATE, POTASSIUM CHLORIDE, CALCIUM CHLORIDE 600; 310; 30; 20 MG/100ML; MG/100ML; MG/100ML; MG/100ML
INJECTION, SOLUTION INTRAVENOUS CONTINUOUS
Status: DISCONTINUED | OUTPATIENT
Start: 2024-08-24 | End: 2024-08-25 | Stop reason: HOSPADM

## 2024-08-24 RX ORDER — FENTANYL CITRATE 50 UG/ML
INJECTION, SOLUTION INTRAMUSCULAR; INTRAVENOUS
Status: DISCONTINUED | OUTPATIENT
Start: 2024-08-24 | End: 2024-08-24

## 2024-08-24 RX ORDER — HYDROMORPHONE HYDROCHLORIDE 2 MG/ML
1 INJECTION, SOLUTION INTRAMUSCULAR; INTRAVENOUS; SUBCUTANEOUS ONCE
Status: COMPLETED | OUTPATIENT
Start: 2024-08-24 | End: 2024-08-24

## 2024-08-24 RX ORDER — ONDANSETRON HYDROCHLORIDE 2 MG/ML
4 INJECTION, SOLUTION INTRAVENOUS EVERY 6 HOURS PRN
Status: DISCONTINUED | OUTPATIENT
Start: 2024-08-24 | End: 2024-08-25 | Stop reason: HOSPADM

## 2024-08-24 RX ORDER — ACETAMINOPHEN 10 MG/ML
1000 INJECTION, SOLUTION INTRAVENOUS ONCE
Status: COMPLETED | OUTPATIENT
Start: 2024-08-24 | End: 2024-08-24

## 2024-08-24 RX ORDER — PROCHLORPERAZINE EDISYLATE 5 MG/ML
2.5 INJECTION INTRAMUSCULAR; INTRAVENOUS EVERY 6 HOURS PRN
Status: DISCONTINUED | OUTPATIENT
Start: 2024-08-24 | End: 2024-08-25 | Stop reason: HOSPADM

## 2024-08-24 RX ORDER — MEPERIDINE HYDROCHLORIDE 25 MG/ML
12.5 INJECTION INTRAMUSCULAR; INTRAVENOUS; SUBCUTANEOUS ONCE AS NEEDED
Status: COMPLETED | OUTPATIENT
Start: 2024-08-24 | End: 2024-08-24

## 2024-08-24 RX ORDER — HYDROMORPHONE HYDROCHLORIDE 2 MG/ML
0.2 INJECTION, SOLUTION INTRAMUSCULAR; INTRAVENOUS; SUBCUTANEOUS EVERY 5 MIN PRN
Status: COMPLETED | OUTPATIENT
Start: 2024-08-24 | End: 2024-08-24

## 2024-08-24 RX ORDER — MIDAZOLAM HYDROCHLORIDE 1 MG/ML
INJECTION INTRAMUSCULAR; INTRAVENOUS
Status: DISCONTINUED | OUTPATIENT
Start: 2024-08-24 | End: 2024-08-24

## 2024-08-24 RX ORDER — ROCURONIUM BROMIDE 10 MG/ML
INJECTION, SOLUTION INTRAVENOUS
Status: DISCONTINUED | OUTPATIENT
Start: 2024-08-24 | End: 2024-08-24

## 2024-08-24 RX ORDER — DEXTROAMPHETAMINE SACCHARATE, AMPHETAMINE ASPARTATE, DEXTROAMPHETAMINE SULFATE AND AMPHETAMINE SULFATE 7.5; 7.5; 7.5; 7.5 MG/1; MG/1; MG/1; MG/1
2 TABLET ORAL 2 TIMES DAILY
COMMUNITY

## 2024-08-24 RX ORDER — ESMOLOL HYDROCHLORIDE 10 MG/ML
INJECTION INTRAVENOUS
Status: DISCONTINUED | OUTPATIENT
Start: 2024-08-24 | End: 2024-08-24

## 2024-08-24 RX ORDER — SODIUM CHLORIDE, SODIUM LACTATE, POTASSIUM CHLORIDE, CALCIUM CHLORIDE 600; 310; 30; 20 MG/100ML; MG/100ML; MG/100ML; MG/100ML
INJECTION, SOLUTION INTRAVENOUS CONTINUOUS
OUTPATIENT
Start: 2024-08-24

## 2024-08-24 RX ORDER — PROPOFOL 10 MG/ML
VIAL (ML) INTRAVENOUS
Status: DISCONTINUED | OUTPATIENT
Start: 2024-08-24 | End: 2024-08-24

## 2024-08-24 RX ORDER — METHOCARBAMOL 100 MG/ML
1000 INJECTION, SOLUTION INTRAMUSCULAR; INTRAVENOUS ONCE
Status: COMPLETED | OUTPATIENT
Start: 2024-08-24 | End: 2024-08-24

## 2024-08-24 RX ORDER — KETOROLAC TROMETHAMINE 30 MG/ML
INJECTION, SOLUTION INTRAMUSCULAR; INTRAVENOUS
Status: DISCONTINUED | OUTPATIENT
Start: 2024-08-24 | End: 2024-08-24

## 2024-08-24 RX ADMIN — ACETAMINOPHEN 325MG 650 MG: 325 TABLET ORAL at 10:08

## 2024-08-24 RX ADMIN — MIDAZOLAM HYDROCHLORIDE 2 MG: 1 INJECTION, SOLUTION INTRAMUSCULAR; INTRAVENOUS at 03:08

## 2024-08-24 RX ADMIN — HYDROMORPHONE HYDROCHLORIDE 0.2 MG: 2 INJECTION INTRAMUSCULAR; INTRAVENOUS; SUBCUTANEOUS at 04:08

## 2024-08-24 RX ADMIN — SODIUM CHLORIDE: 9 INJECTION, SOLUTION INTRAVENOUS at 11:08

## 2024-08-24 RX ADMIN — SODIUM CHLORIDE, POTASSIUM CHLORIDE, SODIUM LACTATE AND CALCIUM CHLORIDE: 600; 310; 30; 20 INJECTION, SOLUTION INTRAVENOUS at 08:08

## 2024-08-24 RX ADMIN — ACETAMINOPHEN 325MG 650 MG: 325 TABLET ORAL at 12:08

## 2024-08-24 RX ADMIN — PROPOFOL 150 MG: 10 INJECTION, EMULSION INTRAVENOUS at 03:08

## 2024-08-24 RX ADMIN — SODIUM CHLORIDE 2 G: 9 INJECTION, SOLUTION INTRAVENOUS at 03:08

## 2024-08-24 RX ADMIN — OXYCODONE HYDROCHLORIDE 5 MG: 5 TABLET ORAL at 05:08

## 2024-08-24 RX ADMIN — ACETAMINOPHEN 325MG 650 MG: 325 TABLET ORAL at 01:08

## 2024-08-24 RX ADMIN — MEPERIDINE HYDROCHLORIDE 12.5 MG: 25 INJECTION INTRAMUSCULAR; INTRAVENOUS; SUBCUTANEOUS at 05:08

## 2024-08-24 RX ADMIN — METHOCARBAMOL 1000 MG: 100 INJECTION INTRAMUSCULAR; INTRAVENOUS at 05:08

## 2024-08-24 RX ADMIN — ACETAMINOPHEN 325MG 650 MG: 325 TABLET ORAL at 05:08

## 2024-08-24 RX ADMIN — Medication 6 MG: at 11:08

## 2024-08-24 RX ADMIN — ONDANSETRON 8 MG: 4 TABLET, ORALLY DISINTEGRATING ORAL at 12:08

## 2024-08-24 RX ADMIN — ESMOLOL HYDROCHLORIDE 20 MG: 100 INJECTION, SOLUTION INTRAVENOUS at 03:08

## 2024-08-24 RX ADMIN — ONDANSETRON 4 MG: 2 INJECTION INTRAMUSCULAR; INTRAVENOUS at 08:08

## 2024-08-24 RX ADMIN — ONDANSETRON 8 MG: 4 TABLET, ORALLY DISINTEGRATING ORAL at 01:08

## 2024-08-24 RX ADMIN — SODIUM CHLORIDE, SODIUM GLUCONATE, SODIUM ACETATE, POTASSIUM CHLORIDE AND MAGNESIUM CHLORIDE: 526; 502; 368; 37; 30 INJECTION, SOLUTION INTRAVENOUS at 02:08

## 2024-08-24 RX ADMIN — SUGAMMADEX 200 MG: 100 INJECTION, SOLUTION INTRAVENOUS at 04:08

## 2024-08-24 RX ADMIN — OXYCODONE HYDROCHLORIDE 10 MG: 10 TABLET ORAL at 08:08

## 2024-08-24 RX ADMIN — DEXAMETHASONE SODIUM PHOSPHATE 4 MG: 4 INJECTION, SOLUTION INTRA-ARTICULAR; INTRALESIONAL; INTRAMUSCULAR; INTRAVENOUS; SOFT TISSUE at 03:08

## 2024-08-24 RX ADMIN — HYDROMORPHONE HYDROCHLORIDE 0.2 MG: 2 INJECTION INTRAMUSCULAR; INTRAVENOUS; SUBCUTANEOUS at 05:08

## 2024-08-24 RX ADMIN — HYDROMORPHONE HYDROCHLORIDE 1 MG: 2 INJECTION INTRAMUSCULAR; INTRAVENOUS; SUBCUTANEOUS at 08:08

## 2024-08-24 RX ADMIN — ONDANSETRON 4 MG: 2 INJECTION INTRAMUSCULAR; INTRAVENOUS at 04:08

## 2024-08-24 RX ADMIN — LIDOCAINE HYDROCHLORIDE 40 MG: 20 INJECTION INTRAVENOUS at 03:08

## 2024-08-24 RX ADMIN — ROCURONIUM BROMIDE 50 MG: 10 SOLUTION INTRAVENOUS at 03:08

## 2024-08-24 RX ADMIN — OXYCODONE HYDROCHLORIDE 5 MG: 5 TABLET ORAL at 09:08

## 2024-08-24 RX ADMIN — SODIUM CHLORIDE, SODIUM GLUCONATE, SODIUM ACETATE, POTASSIUM CHLORIDE AND MAGNESIUM CHLORIDE: 526; 502; 368; 37; 30 INJECTION, SOLUTION INTRAVENOUS at 04:08

## 2024-08-24 RX ADMIN — FENTANYL CITRATE 100 MCG: 50 INJECTION, SOLUTION INTRAMUSCULAR; INTRAVENOUS at 03:08

## 2024-08-24 RX ADMIN — SODIUM CHLORIDE: 9 INJECTION, SOLUTION INTRAVENOUS at 12:08

## 2024-08-24 RX ADMIN — OXYCODONE HYDROCHLORIDE 10 MG: 10 TABLET ORAL at 12:08

## 2024-08-24 RX ADMIN — PROCHLORPERAZINE EDISYLATE 2.5 MG: 5 INJECTION INTRAMUSCULAR; INTRAVENOUS at 08:08

## 2024-08-24 RX ADMIN — KETOROLAC TROMETHAMINE 30 MG: 30 INJECTION, SOLUTION INTRAMUSCULAR; INTRAVENOUS at 04:08

## 2024-08-24 RX ADMIN — ACETAMINOPHEN 1000 MG: 10 INJECTION, SOLUTION INTRAVENOUS at 04:08

## 2024-08-24 NOTE — ED NOTES
Lobby Round. Pt sitting in main lobby alert and oriented at this time. Vitals assessed. Pt denies any changes from intial assessment at this time. Pt does not appear to be in any immediate distress at this time.

## 2024-08-24 NOTE — H&P
Acute Care Surgery   History and Physical    Patient Name: Jacqueline Cedillo  YOB: 1990  Date of Admission: 8/23/2024  HD#0  POD#* No surgery date entered *    PRESENTING HISTORY   Chief Complaint/Reason for Admission: Symptomatic cholelithiasis    History of Present Illness:  Ms. Bojorquez is a very pleasant 33-year-old female with a past medical history of mild TBI, ADD, pots who presents with epigastric/right upper quadrant abdominal pain that has been gradually worsening over the past few months.  Lately, she has been having a take Zofran multiple times a week for the associated nausea.  Her symptoms are worse after eating.  She denies any emesis but that she is debilitated by this pain in longer tolerate any p.o..  The pain is now constant.    Review of Systems:  12 point ROS negative except as stated in HPI    PAST HISTORY:   Past medical history:  Past Medical History:   Diagnosis Date    Attention-deficit hyperactivity disorder, unspecified type     Endometriosis, unspecified     Generalized anxiety disorder     History of gestational diabetes     History of gestational hypertension     History of pre-eclampsia     Hypercholesterolemia     Migraine     Mild traumatic brain injury     Mitral valve prolapse     Post partum depression     Postural orthostatic tachycardia syndrome     Symptomatic cholelithiasis 08/23/2024    TIA (transient ischemic attack)        Past surgical history:  No past surgical history on file.    Family history:  Family History   Problem Relation Name Age of Onset    Hypertension Maternal Grandmother      Diabetes Maternal Grandmother      Hypertension Maternal Grandfather      Diabetes Maternal Grandfather      Hypertension Paternal Grandmother      Diabetes Paternal Grandmother      Hypertension Paternal Grandfather      Diabetes Paternal Grandfather         Social history:  Social History     Socioeconomic History    Marital status:    Tobacco Use     Smoking status: Never    Smokeless tobacco: Never     Social Determinants of Health     Financial Resource Strain: Low Risk  (10/4/2023)    [facility]    Overall Financial Resource Strain (CARDIA)     Difficulty of Paying Living Expenses: Not hard at all   Food Insecurity: No Food Insecurity (10/4/2023)    [facility]    Hunger Vital Sign     Worried About Running Out of Food in the Last Year: Never true     Ran Out of Food in the Last Year: Never true   Transportation Needs: No Transportation Needs (10/4/2023)    [facility] from [facility], [facility]    PRAPARE - Transportation     Lack of Transportation (Medical): No     Lack of Transportation (Non-Medical): No   Housing Stability: High Risk (10/4/2023)    [facility], [facility]    Housing Stability Vital Sign     Unable to Pay for Housing in the Last Year: Yes     Number of Places Lived in the Last Year: 1     Unstable Housing in the Last Year: No     Social History     Tobacco Use   Smoking Status Never   Smokeless Tobacco Never      Social History     Substance and Sexual Activity   Alcohol Use None        MEDICATIONS & ALLERGIES:     No current facility-administered medications on file prior to encounter.     Current Outpatient Medications on File Prior to Encounter   Medication Sig    fluconazole (DIFLUCAN)  "150 MG Tab Take 150 mg by mouth.    HYDROcodone-acetaminophen (NORCO) 5-325 mg per tablet Take 1 tablet by mouth every 6 (six) hours as needed.    methocarbamoL (ROBAXIN) 500 MG Tab Take 500 mg by mouth 4 (four) times daily as needed.    metoprolol tartrate (LOPRESSOR) 25 MG tablet Take 12.5 mg by mouth 2 (two) times daily.    nadoloL (CORGARD) 20 MG tablet Take 20 mg by mouth.    norethindrone (AYGESTIN) 5 mg Tab Take 5 mg by mouth every morning.    ondansetron (ZOFRAN-ODT) 4 MG TbDL Take 4 mg by mouth every 8 (eight) hours as needed.    rizatriptan (MAXALT-MLT) 10 MG disintegrating tablet Take 10 mg by mouth daily as needed.    terbinafine HCL (LAMISIL) 250 mg tablet Take 250 mg by mouth every morning.    traZODone (DESYREL) 50 MG tablet Take 50 mg by mouth every evening.    triamterene-hydrochlorothiazide 37.5-25 mg (MAXZIDE-25) 37.5-25 mg per tablet Take 1 tablet by mouth every morning.       Allergies:   Review of patient's allergies indicates:   Allergen Reactions    Latex Rash       Scheduled Meds:   acetaminophen  650 mg Oral Q4H       Continuous Infusions:   0.9% NaCl   Intravenous Continuous 100 mL/hr at 08/24/24 0044 New Bag at 08/24/24 0044       PRN Meds:  Current Facility-Administered Medications:     acetaminophen, 650 mg, Oral, Q8H PRN    melatonin, 6 mg, Oral, Nightly PRN    ondansetron, 8 mg, Oral, Q8H PRN    ondansetron, 4 mg, Intravenous, Q12H PRN    oxyCODONE, 5 mg, Oral, Q4H PRN    oxyCODONE, 10 mg, Oral, Q4H PRN    OBJECTIVE:   Vital Signs:  VITAL SIGNS: 24 HR MIN & MAX LAST   Temp  Min: 97.7 °F (36.5 °C)  Max: 98.9 °F (37.2 °C)  98.9 °F (37.2 °C)   BP  Min: 114/95  Max: 124/88  (!) 114/95    Pulse  Min: 104  Max: 126  104    Resp  Min: 18  Max: 20  20    SpO2  Min: 100 %  Max: 100 %  100 %      HT: 5' 4" (162.6 cm)  WT: 59 kg (130 lb)  BMI: 22.3     No intake/output data recorded.  No intake/output data recorded.    Physical Exam:  General: Well developed, well nourished, no acute " "distress  HEENT: Normocephalic, atraumatic, PERRL  CV: RR  Resp: NWOB  GI:  Abdomen soft, positive Faith's sign, non-distended, no guarding, no rebound, normoactive bowel sounds, no masses  :  Deferred  MSK: No muscle atrophy, cyanosis, peripheral edema, moving all extremities spontaneously  Skin/wounds:  No rashes, ulcers, erythema  Neuro:  CNII-XII grossly intact, alert and oriented to person, place, and time    Labs:  Troponin:  Recent Labs     08/23/24 1818   TROPONINI <0.010     CBC:  Recent Labs     08/23/24 1818   WBC 10.68   RBC 4.67   HGB 14.0   HCT 40.4      MCV 86.5   MCH 30.0   MCHC 34.7     CMP:  Recent Labs     08/23/24 1818   CALCIUM 9.4   ALBUMIN 4.2      K 3.7   CO2 28   CL 98   BUN 19.7*   CREATININE 0.89   ALKPHOS 50   ALT 12   AST 14   BILITOT 0.4     Lactic Acid:  No results for input(s): "LACTATE" in the last 72 hours.  ETOH:  No results for input(s): "ETHANOL" in the last 72 hours.   Urine Drug Screen:  No results for input(s): "COCAINE", "OPIATE", "BARBITURATE", "AMPHETAMINE", "FENTANYL", "CANNABINOIDS", "MDMA" in the last 72 hours.    Invalid input(s): "BENZODIAZEPINE", "PHENCYCLIDINE"   ABG:  No results for input(s): "PH", "PCO2", "PO2", "HCO3", "BE", "POCSATURATED" in the last 72 hours.    Diagnostic Results:  US Abdomen Limited    Result Date: 8/23/2024  Cholelithiasis.  Positive sonographic Faith sign, but no gallbladder wall thickening or ascites.  Appearance equivocal for early cholecystitis. Electronically signed by: Clayton Mcintyre Date:    08/23/2024 Time:    19:40    US Abdomen Limited   Final Result      Cholelithiasis.  Positive sonographic Faith sign, but no gallbladder wall thickening or ascites.  Appearance equivocal for early cholecystitis.         Electronically signed by: Clayton Mcintyre   Date:    08/23/2024   Time:    19:40          ASSESSMENT & PLAN:    33-year-old female with symptomatic cholelithiasis versus early acute cholecystitis.    -we will admit " to inpatient.  Plan for laparoscopic cholecystectomy tomorrow  -maintenance IV fluids   -NPO   -as needed antiemetics and analgesics   -Lovenox prophylaxis    Gelacio De La Rosa MD  [facility] General Surgery, PGY-4

## 2024-08-24 NOTE — ED NOTES
Spoke to Dr. Larson/Dr. Vick regarding pt's pain remaining uncontrolled after Oxycodone 10mg. States to give ordered  oxycodone 5 mg additionally. No new orders at this time.

## 2024-08-24 NOTE — PROGRESS NOTES
"   Acute Care Surgery   Progress Note  Admit Date: 8/23/2024  HD#0  POD#Day of Surgery    Subjective:   Interval history:  No acute events overnight   Afebrile, vitals within normal limits   Labs still within normal limits   Pain is about the same maybe slightly improved.     Scheduled Meds:   acetaminophen  650 mg Oral Q4H     Continuous Infusions:   0.9% NaCl   Intravenous Continuous 100 mL/hr at 08/24/24 0044 New Bag at 08/24/24 0044     PRN Meds:  Current Facility-Administered Medications:     acetaminophen, 650 mg, Oral, Q8H PRN    melatonin, 6 mg, Oral, Nightly PRN    ondansetron, 8 mg, Oral, Q8H PRN    ondansetron, 4 mg, Intravenous, Q12H PRN    oxyCODONE, 5 mg, Oral, Q4H PRN    oxyCODONE, 10 mg, Oral, Q4H PRN     Objective:     VITAL SIGNS: 24 HR MIN & MAX LAST   Temp  Min: 97.7 °F (36.5 °C)  Max: 98.9 °F (37.2 °C)  97.9 °F (36.6 °C)   BP  Min: 100/69  Max: 127/94  100/69    Pulse  Min: 93  Max: 126  99    Resp  Min: 13  Max: 22  (!) 22    SpO2  Min: 97 %  Max: 100 %  100 %      HT: 5' 4" (162.6 cm)  WT: 59 kg (130 lb)  BMI: 22.3     Intake/output:  Intake/Output - Last 3 Shifts       None          No intake or output data in the 24 hours ending 08/24/24 0928     Lines/drains/airway:       Peripheral IV - Single Lumen 08/23/24 2100 20 G Anterior;Left;Proximal Forearm (Active)   Number of days: 0       Physical examination:  Gen: NAD, AAOx3, answering questions appropriately  HEENT:  CV: RR  Resp: NWOB  Abd: S/ND, positive Faith sign  Ext: moving all extremities spontaneously and purposefully  Neuro: CN II-XII grossly intact  Skin/wounds:    Labs:  Renal:  Recent Labs     08/23/24 1818 08/24/24  0333   BUN 19.7* 14.5   CREATININE 0.89 0.78     No results for input(s): "LACTIC" in the last 72 hours.  FENGI:  Recent Labs     08/23/24 1818 08/24/24  0333    140   K 3.7 3.8   CL 98 106   CO2 28 24   CALCIUM 9.4 8.4   ALBUMIN 4.2 3.6   BILITOT 0.4 0.4   AST 14 10   ALKPHOS 50 41   ALT 12 9 "     Heme:  Recent Labs     08/23/24  1818 08/24/24  0333   HGB 14.0 11.9*   HCT 40.4 34.6*    228     ID:  Recent Labs     08/23/24 1818 08/24/24  0333   WBC 10.68 11.42     CBG:  Recent Labs     08/23/24 1818 08/24/24  0333   GLUCOSE 85 99      Cardiovascular:  Recent Labs   Lab 08/23/24 1818   TROPONINI <0.010     I have reviewed all pertinent lab results within the past 24 hours.    Imaging:  US Abdomen Limited   Final Result      Cholelithiasis.  Positive sonographic Faith sign, but no gallbladder wall thickening or ascites.  Appearance equivocal for early cholecystitis.         Electronically signed by: Clayton Mcintyre   Date:    08/23/2024   Time:    19:40         I have reviewed all pertinent imaging results/findings within the past 24 hours.    Micro/Path/Other:  Microbiology Results (last 7 days)       ** No results found for the last 168 hours. **           Pathology Results  (Last 7 days)      None             Assessment & Plan:   26-year-old female with symptomatic cholelithiasis versus early acute cholecystitis.    -plan for OR today for laparoscopic cholecystectomy   -Zosyn   -maintenance IV fluids  -pain control, prn mame De La Rosa MD  [facility] General Surgery, PGY-4  8/24/2024 9:28 AM

## 2024-08-24 NOTE — TRANSFER OF CARE
"Anesthesia Transfer of Care Note    Patient: Jacqueline Cedillo    Procedure(s) Performed: Procedure(s) (LRB):  CHOLECYSTECTOMY, LAPAROSCOPIC (N/A)    Patient location: PACU    Anesthesia Type: general    Transport from OR: Transported from OR on 6-10 L/min O2 by face mask with adequate spontaneous ventilation    Post pain: adequate analgesia    Post assessment: no apparent anesthetic complications and tolerated procedure well    Post vital signs: stable    Level of consciousness: sedated and responds to stimulation    Nausea/Vomiting: no nausea/vomiting    Complications: none    Transfer of care protocol was followed      Last vitals: Visit Vitals  /79   Pulse 93   Temp 36.6 °C (97.9 °F) (Oral)   Resp 18   Ht 5' 4" (1.626 m)   Wt 59 kg (130 lb)   LMP 08/06/2024 (Approximate)   SpO2 99%   Breastfeeding No   BMI 22.31 kg/m²     "

## 2024-08-24 NOTE — ANESTHESIA POSTPROCEDURE EVALUATION
Anesthesia Post Evaluation    Patient: Jacqueline Cedillo    Procedure(s) Performed: Procedure(s) (LRB):  CHOLECYSTECTOMY, LAPAROSCOPIC (N/A)    Final Anesthesia Type: general (/Regional//MAC)      Patient location during evaluation: PACU  Post-procedure mental status: @ basline.  Post-procedure vital signs: reviewed and stable  Pain management: adequate    PONV status at discharge: No PONV  Anesthetic complications: no      Cardiovascular status: blood pressure returned to baseline  Respiratory status: @ baseline.  Hydration status: euvolemic                Vitals Value Taken Time   /79 08/24/24 1701        Pulse 105 08/24/24 1729   Resp 14 08/24/24 1728   SpO2 98 % 08/24/24 1729   Vitals shown include unfiled device data.      No case tracking events are documented in the log.      Pain/Nay Score: Pain Rating Prior to Med Admin: 8 (8/24/2024  5:10 PM)  Pain Rating Post Med Admin: 3 (8/24/2024  1:36 AM)  Nay Score: 6 (8/24/2024  4:15 PM)

## 2024-08-24 NOTE — ANESTHESIA PREPROCEDURE EVALUATION
"08/24/2024    Jacqueline Cedillo is a 33 y.o., female h/o mild TBI, TIA, workup for TIA inconclusive and ongoing, ADD, POTS, not on any meds for POTS at present and other medical problems noted in the EMR who presents with epigastric/right upper quadrant abdominal pain     Pre-op Assessment    I have reviewed the Patient Summary Reports.     I have reviewed the Nursing Notes. I have reviewed the NPO Status.   I have reviewed the Medications.     Review of Systems  Anesthesia Hx:  No problems with previous Anesthesia                Cardiovascular:  Exercise tolerance: good                                               Pre-operative evaluation for Procedure(s) (LRB):  CHOLECYSTECTOMY, LAPAROSCOPIC (N/A)    /71   Pulse 82   Temp 36.6 °C (97.9 °F) (Oral)   Resp 18   Ht 5' 4" (1.626 m)   Wt 59 kg (130 lb)   LMP 08/06/2024 (Approximate)   SpO2 100%   BMI 22.31 kg/m²     Past Medical History:   Diagnosis Date    Attention-deficit hyperactivity disorder, unspecified type     Endometriosis, unspecified     Generalized anxiety disorder     History of gestational diabetes     History of gestational hypertension     History of pre-eclampsia     Hypercholesterolemia     Migraine     Mild traumatic brain injury     Mitral valve prolapse     Post partum depression     Postural orthostatic tachycardia syndrome     Symptomatic cholelithiasis 08/23/2024    TIA (transient ischemic attack)        Patient Active Problem List   Diagnosis    Postconcussion syndrome    Vision loss, bilateral    Symptomatic cholelithiasis       Review of patient's allergies indicates:   Allergen Reactions    Latex Rash       Current Outpatient Medications   Medication Instructions    buPROPion (WELLBUTRIN XL) 300 MG 24 hr tablet 1 tablet, Oral, Every morning    dextroamphetamine-amphetamine 30 mg Tab 2 tablets, Oral, 2 times daily    fluconazole (DIFLUCAN) 150 mg    HYDROcodone-acetaminophen (NORCO) 5-325 mg per tablet 1 tablet, Oral, " Every 6 hours PRN    methocarbamoL (ROBAXIN) 500 mg, Oral, 4 times daily PRN    metoprolol tartrate (LOPRESSOR) 12.5 mg, Oral, 2 times daily    nadoloL (CORGARD) 20 mg, Oral    norethindrone (AYGESTIN) 5 mg, Oral, Every morning    ondansetron (ZOFRAN-ODT) 4 mg, Oral, Every 8 hours PRN    rizatriptan (MAXALT-MLT) 10 mg, Oral, Daily PRN    terbinafine HCL (LAMISIL) 250 mg, Oral, Every morning    traZODone (DESYREL) 50 mg, Oral, Nightly    triamterene-hydrochlorothiazide 37.5-25 mg (MAXZIDE-25) 37.5-25 mg per tablet 1 tablet, Oral, Every morning       No past surgical history on file.    Social History     Socioeconomic History    Marital status:    Tobacco Use    Smoking status: Never    Smokeless tobacco: Never     Social Determinants of Health     Financial Resource Strain: Low Risk  (10/4/2023)    [facility]    Overall Financial Resource Strain (CARDIA)     Difficulty of Paying Living Expenses: Not hard at all   Food Insecurity: No Food Insecurity (10/4/2023)    [facility]    Hunger Vital Sign     Worried About Running Out of Food in the Last Year: Never true     Ran Out of Food in the Last Year: Never true   Transportation Needs: No Transportation Needs (10/4/2023)    [facility], [facility]    PRAPARE - Transportation     Lack of Transportation (Medical): No     Lack of Transportation (Non-Medical): No   Housing Stability: High Risk (10/4/2023)    [facility] from [facility] and Affiliates, [facility] and Affiliates    Housing Stability Vital Sign     Unable to Pay for Housing in the Last Year: Yes     Number of Places Lived in the Last Year: 1     Unstable Housing in the Last Year: No         Physical Exam  General: Well nourished and Cooperative    Airway:  Mallampati: II   Mouth Opening: Normal  TM Distance: Normal  Tongue: Normal  Neck ROM: Normal ROM    Dental:  Intact    Chest/Lungs:  Clear to auscultation    Heart:  Rhythm: Regular Rhythm        Lab Results   Component Value Date    WBC 11.42 08/24/2024    HGB 11.9 (L) 08/24/2024    HCT 34.6 (L) 08/24/2024    MCV 87.6 08/24/2024     08/24/2024          BMP  Lab Results   Component Value Date    HCT 34.6 (L) 08/24/2024     08/24/2024    K 3.8 08/24/2024    BUN 14.5 08/24/2024    CREATININE 0.78 08/24/2024    CALCIUM 8.4 08/24/2024            Diagnostic Studies:  .  EKG  No results found for this or any previous visit.      Anesthesia Plan  Type of Anesthesia, risks & benefits discussed:    Anesthesia Type: Gen ETT  Intra-op Monitoring Plan: Standard ASA Monitors  Post Op Pain Control Plan: multimodal analgesia  Induction:  IV  Informed Consent: Informed consent signed with the Patient and all parties understand the risks and agree with anesthesia plan.  All questions answered.   ASA Score: 2  Day of Surgery Review of History & Physical: H&P Update referred to the surgeon/provider.    Ready For Surgery From Anesthesia Perspective.     .  Anesthesia consent includes material facts, risks, complications & alternatives, and possibility of altering the anesthesia plan due to intraoperative conditions.    I reviewed problem list, prior to admission medication list, appropriate labs, any workup, Xray, EKG etc noted below.    See anesthesia chart for details of the anesthesia plan carried out.       Derick Carmona MD

## 2024-08-24 NOTE — OP NOTE
[facility] - Periop Services  Cholecystectomy   Procedure Note    SUMMARY     Date of Procedure: 8/24/2024     Procedure: Lap louis    Provider: Dustin Larson Jr, MD    Assisting Provider: Jennifer Kenny PGY-2                                    Jenniefr Vick PGY-1    Indications: This patient presents with symptomatic gallbladder disease and will undergo laparoscopic cholecystectomy.    Pre-Operative Diagnosis: Calculus of gallbladder without mention of cholecystitis or obstruction    Post-Operative Diagnosis: Calculus of gallbladder without mention of cholecystitis or obstruction    Anesthesia: general        Description of the Findings of the Procedure:     The patient was seen again in the Holding Room. The risks, benefits, complications, treatment options, and expected outcomes were discussed with the patient. The possibilities of reaction to medication, pulmonary aspiration, perforation of viscus, bleeding, recurrent infection, finding a normal gallbladder, the need for additional procedures, failure to diagnose a condition, the possible need to convert to an open procedure, and creating a complication requiring transfusion or operation were discussed with the patient. The patient and/or family concurred with the proposed plan, giving informed consent. The site of surgery properly noted/marked. The patient was taken to Operating Room, identified as Jacqueline Cedillo and the procedure verified as Laparoscopic Cholecystectomy with Intraoperative Cholangiograms. A Time Out was held and the above information confirmed.  The patient was taken to the operating room and placed supine on the operating table.  After general endotracheal anesthesia was induced, the abdomen was prepped and draped in the standard sterile surgical fashion. A time out was done to make sure we have the appropriate patient, appropriate operation, and appropriate medications. The Veress needle was introduced through a stab  incision supraumbilical. Pneumoperitoneum was then insufflated. A 5mm optical port was then used to enter the abdomen under direct visualization without issue.  Two additional 5 millimeter ports were placed in the right upper quadrant followed by a 12mm trocar placed subxiphoid.  The gallbladder was then visualized and retracted both superiorly and laterally.  Dissection was carried out in lateral to medial fashion.  The cystic duct and cystic artery were both dissected from surrounding tissue and the critical view of safety was achieved.  Two clips were placed twice proximally on each structure and one distally and then transected.  The gallbladder was then removed from the liver bed using the hook cautery and passed off the table as specimen through the subxiphoid trocar port site.  Attention was then redirected to the gallbladder fossa, no active bleeding was noted.  Good hemostasis was visualized.  All ports were then removed under direct visualization with no active bleeding noted.  Skin was then closed with 4-0 mono suture.  Skin was  cleaned and dry and derma bond was used for wound coverage.  Lap and instrument  counts were correct X2 at the end of the case.  Patient tolerated the procedure well and was transferred to recovery room in good condition.       Complications: None; patient tolerated the procedure well.    Total IV Fluids: see anesthesia log    Estimated Blood Loss (EBL): Minimal     Drains: none           Implants: none    Specimens: Gallbladder           Condition: stable    Disposition: PACU - hemodynamically stable.    Attestation: I was present and scrubbed for the entire procedure.    Dustin Larson Jr, MD MS  Trauma Critical Care Surgery

## 2024-08-24 NOTE — DISCHARGE SUMMARY
[facility] - Periop Services  General Surgery  Discharge Summary      Patient Name: Jacqueline Cedillo  MRN: [id number]  Admission Date: 8/23/2024  Hospital Length of Stay: 0 days  Discharge Date and Time:  08/25/2024 7:26AM  Attending Physician: Dustin Larson Jr., *   Discharging Provider: Jennifer Kenny MD  Primary Care Provider: No, Primary Doctor     HPI: 34 yo female with symptomatic cholelithiasis     Procedure(s) (LRB):  CHOLECYSTECTOMY, LAPAROSCOPIC (N/A)     Hospital Course: Patient presented with upper abdominal pain that has been intermittent for several months. Her imaging was suggestive of cholelithiasis, but patient was unable to tolerated PO in ED with constant pain. Patient admitted to general surgery and taken to OR for laparoscopic cholecystectomy. Please see operative report for further details. Patient was deemed stable for discharge on post op day 1.     Consults: None    Significant Diagnostic Studies:   US Abdomen Limited   Final Result      Cholelithiasis.  Positive sonographic Faith sign, but no gallbladder wall thickening or ascites.  Appearance equivocal for early cholecystitis.         Electronically signed by: Clayton Mcintyre   Date:    08/23/2024   Time:    19:40            Pending Diagnostic Studies:       Procedure Component Value Units Date/Time    Specimen to Pathology [[id number]] Collected: 08/24/24 1505    Order Status: Sent Lab Status: No result     Specimen: Tissue from Gallbladder           Final Active Diagnoses:    Diagnosis Date Noted POA    PRINCIPAL PROBLEM:  Symptomatic cholelithiasis [K80.20] 08/23/2024 Yes      Problems Resolved During this Admission:      Discharged Condition: good    Disposition: Home or Self Care    Follow Up: 2 week phone follow up    Patient Instructions:      Diet Adult Regular     Diet Adult Regular     Other restrictions (specify):   Order Comments: Okay to shower as normal. No bathing or swimming for 2 weeks     Lifting  restrictions   Order Comments: No lifting over 20 lbs for 2 weeks     No dressing needed     Other restrictions (specify):   Order Comments: Okay to shower as normal. No bathing or swimming for 2 weeks.     Lifting restrictions   Order Comments: No lifting over 20 lbs for 2 weeks     No dressing needed     Medications:  Reconciled Home Medications:      Medication List        CHANGE how you take these medications      * HYDROcodone-acetaminophen 5-325 mg per tablet  Commonly known as: NORCO  Take 1 tablet by mouth every 6 (six) hours as needed.  What changed: Another medication with the same name was added. Make sure you understand how and when to take each.     * HYDROcodone-acetaminophen 5-325 mg per tablet  Commonly known as: NORCO  Take 1 tablet by mouth every 6 (six) hours as needed for Pain.  What changed: You were already taking a medication with the same name, and this prescription was added. Make sure you understand how and when to take each.     * ondansetron 4 MG Tbdl  Commonly known as: ZOFRAN-ODT  Take 4 mg by mouth every 8 (eight) hours as needed.  What changed: Another medication with the same name was added. Make sure you understand how and when to take each.     * ondansetron 4 MG Tbdl  Commonly known as: ZOFRAN-ODT  Take 1 tablet (4 mg total) by mouth every 8 (eight) hours as needed.  What changed: You were already taking a medication with the same name, and this prescription was added. Make sure you understand how and when to take each.           * This list has 4 medication(s) that are the same as other medications prescribed for you. Read the directions carefully, and ask your doctor or other care provider to review them with you.                CONTINUE taking these medications      buPROPion 300 MG 24 hr tablet  Commonly known as: WELLBUTRIN XL  Take 1 tablet by mouth every morning.     dextroamphetamine-amphetamine 30 mg Tab  Take 2 tablets by mouth 2 (two) times daily.     methocarbamoL 500  MG Tab  Commonly known as: ROBAXIN  Take 500 mg by mouth 4 (four) times daily as needed.     rizatriptan 10 MG disintegrating tablet  Commonly known as: MAXALT-MLT  Take 10 mg by mouth daily as needed.     traZODone 50 MG tablet  Commonly known as: DESYREL  Take 50 mg by mouth every evening.     triamterene-hydrochlorothiazide 37.5-25 mg 37.5-25 mg per tablet  Commonly known as: MAXZIDE-25  Take 1 tablet by mouth every morning.            STOP taking these medications      metoprolol tartrate 25 MG tablet  Commonly known as: LOPRESSOR     nadoloL 20 MG tablet  Commonly known as: CORGARD     norethindrone 5 mg Tab  Commonly known as: AYGESTIN     terbinafine  mg tablet  Commonly known as: LAMISIL            ASK your doctor about these medications      fluconazole 150 MG Tab  Commonly known as: DIFLUCAN  Take 150 mg by mouth.              Jennifer Kenny MD  General Surgery  [facility] - [facility]

## 2024-08-24 NOTE — NURSING
Paged surgery on call per pt request who stated they are not comfortable with DC tonight. ALVAREZ Kenny called back, relayed information to her who stated that's fine, you can cancel DC order. Updated pt and spouse who agreed with plan.

## 2024-08-24 NOTE — ED PROVIDER NOTES
Encounter Date: 8/23/2024    SCRIBE #1 NOTE: I, Minh Perdomo, am scribing for, and in the presence of,  Dr. Brent Frazier IV, MD. I have scribed the following portions of the note - Other sections scribed: HPI, ROS, and PE..       History     Chief Complaint   Patient presents with    Medical Evaluation     C/o epigastric abd pain that radiates to right shoulder x a few days, worsening today. Pt c/o nausea. Denies V/D & fever. Was sent here by PCP.      Jacqueline Cedillo is a 33 y.o. female patient with a PMHx of TIA, hypercholesteremia, ELIJAH who presents to the Emergency Department for evaluation of epigastric abd pain which onset gradually over the past year that has worsened over the past month and worsened even more yesterday. She was sent here by Linden Sepulveda (pcp) for possible gallbladder issues. She also complains of right shoulder pain. Abd pain worsened with eating. Associated symptoms include nausea. Patient denies any vomiting, diarrhea, fever.       The history is provided by the patient. No  was used.     Review of patient's allergies indicates:   Allergen Reactions    Latex Rash     Past Medical History:   Diagnosis Date    Attention-deficit hyperactivity disorder, unspecified type     Endometriosis, unspecified     Generalized anxiety disorder     History of gestational diabetes     History of gestational hypertension     History of pre-eclampsia     Hypercholesterolemia     Migraine     Mild traumatic brain injury     Mitral valve prolapse     Post partum depression     Postural orthostatic tachycardia syndrome     Symptomatic cholelithiasis 08/23/2024    TIA (transient ischemic attack)      No past surgical history on file.  Family History   Problem Relation Name Age of Onset    Hypertension Maternal Grandmother      Diabetes Maternal Grandmother      Hypertension Maternal Grandfather      Diabetes Maternal Grandfather      Hypertension Paternal Grandmother      Diabetes  Paternal Grandmother      Hypertension Paternal Grandfather      Diabetes Paternal Grandfather       Social History     Tobacco Use    Smoking status: Never    Smokeless tobacco: Never     Review of Systems   Constitutional:  Negative for chills and fever.   HENT:  Negative for congestion, rhinorrhea and sore throat.    Eyes:  Negative for visual disturbance.   Respiratory:  Negative for cough and shortness of breath.    Cardiovascular:  Negative for chest pain and leg swelling.   Gastrointestinal:  Positive for abdominal pain and nausea. Negative for diarrhea and vomiting.   Genitourinary:  Negative for dysuria, hematuria, vaginal bleeding and vaginal discharge.   Musculoskeletal:  Negative for joint swelling.   Skin:  Negative for rash.   Neurological:  Negative for weakness.   Psychiatric/Behavioral:  Negative for confusion.        Physical Exam     Initial Vitals [08/23/24 1803]   BP Pulse Resp Temp SpO2   124/88 (!) 126 20 97.7 °F (36.5 °C) 100 %      MAP       --         Physical Exam    Nursing note and vitals reviewed.  Constitutional: She is not diaphoretic. No distress.   HENT:   Head: Normocephalic and atraumatic.   Neck: Neck supple.   Normal range of motion.  Cardiovascular:  Regular rhythm.   Tachycardia present.         No murmur heard.  Pulmonary/Chest: Breath sounds normal. No respiratory distress.   Abdominal: Abdomen is soft. She exhibits no distension. There is abdominal tenderness in the right upper quadrant and epigastric area.   Musculoskeletal:      Cervical back: Normal range of motion and neck supple.     Neurological: She is alert and oriented to person, place, and time. She has normal strength. No cranial nerve deficit or sensory deficit.   Skin: Skin is warm. Capillary refill takes less than 2 seconds.   Psychiatric: She has a normal mood and affect.         ED Course   Procedures  Labs Reviewed   COMPREHENSIVE METABOLIC PANEL - Abnormal       Result Value    Sodium 137      Potassium  3.7      Chloride 98      CO2 28      Glucose 85      Blood Urea Nitrogen 19.7 (*)     Creatinine 0.89      Calcium 9.4      Protein Total 7.3      Albumin 4.2      Globulin 3.1      Albumin/Globulin Ratio 1.4      Bilirubin Total 0.4      ALP 50      ALT 12      AST 14      eGFR >60      Anion Gap 11.0      BUN/Creatinine Ratio 22     URINALYSIS, REFLEX TO URINE CULTURE - Abnormal    Color, UA Yellow      Appearance, UA Turbid (*)     Specific Gravity, UA 1.030      pH, UA 6.0      Protein, UA Trace (*)     Glucose, UA Normal      Ketones, UA Negative      Blood, UA Trace (*)     Bilirubin, UA Negative      Urobilinogen, UA Normal      Nitrites, UA Negative      Leukocyte Esterase, UA Negative      RBC, UA 6-10 (*)     WBC, UA 6-10 (*)     Bacteria, UA Few (*)     Squamous Epithelial Cells, UA Few (*)     Mucous, UA Trace (*)    CBC WITH DIFFERENTIAL - Abnormal    WBC 10.68      RBC 4.67      Hgb 14.0      Hct 40.4      MCV 86.5      MCH 30.0      MCHC 34.7      RDW 11.8      Platelet 290      MPV 11.4 (*)     Neut % 62.7      Lymph % 29.2      Mono % 5.1      Eos % 2.2      Basophil % 0.5      Lymph # 3.12      Neut # 6.71      Mono # 0.54      Eos # 0.23      Baso # 0.05      IG# 0.03      IG% 0.3      NRBC% 0.0     LIPASE - Normal    Lipase Level 25     PREGNANCY TEST, URINE RAPID - Normal    hCG Qualitative, Urine Negative     TROPONIN I - Normal    Troponin-I <0.010     CBC W/ AUTO DIFFERENTIAL    Narrative:     The following orders were created for panel order CBC auto differential.  Procedure                               Abnormality         Status                     ---------                               -----------         ------                     CBC with Differential[[id number]]       Abnormal            Final result                 Please view results for these tests on the individual orders.          Imaging Results              US Abdomen Limited (Final result)  Result time 08/23/24 19:40:23       Final result by Clayton Mcintyre MD (08/23/24 19:40:23)                   Impression:      Cholelithiasis.  Positive sonographic Faith sign, but no gallbladder wall thickening or ascites.  Appearance equivocal for early cholecystitis.      Electronically signed by: Clayton Mcintyre  Date:    08/23/2024  Time:    19:40               Narrative:    EXAMINATION:  US ABDOMEN LIMITED    CLINICAL HISTORY:  RUQ abdominal pain;    TECHNIQUE:  Gray-scale and color Doppler ultrasound images of the abdomen.    COMPARISON:  CT 12/16/2021    FINDINGS:  Pancreas obscured by bowel gas.  Proximal aorta also obscured.  No significant findings regarding the mid/distal aorta and superior IVC.    Liver has normal size and echogenicity.  Main portal vein patent with normal flow direction.  Normal CBD caliber.  Several gallstones.  No gallbladder wall thickening or ascites.  Positive sonographic Faith sign.    Right kidney partially obscured, but no right-sided hydronephrosis.    Measurements:    - Liver: 14.9 cm    - CBD diameter: 3 mm    - Right kidney: 11.0 cm in length                                       Medications   0.9%  NaCl infusion (has no administration in time range)   ondansetron disintegrating tablet 8 mg (has no administration in time range)   ondansetron injection 4 mg (has no administration in time range)   melatonin tablet 6 mg (has no administration in time range)   acetaminophen tablet 650 mg (has no administration in time range)   acetaminophen tablet 650 mg (has no administration in time range)   oxyCODONE immediate release tablet 5 mg (has no administration in time range)   oxyCODONE immediate release tablet Tab 10 mg (has no administration in time range)   lactated ringers bolus 1,000 mL (0 mLs Intravenous Stopped 8/23/24 2327)   ondansetron injection 4 mg (4 mg Intravenous Given 8/23/24 2124)   morphine injection 4 mg (4 mg Intravenous Given 8/23/24 2135)     Medical Decision Making  34 yo presenting with  constant ruq pain, has had intermittent pain post prandial for weeks to months. Now constant, + murphys sign. US with gallstones, no signs of cholecystitis at this time, + sonographic ascencio's. Discussed with general surgery who will admit     The differential diagnosis includes, but is not limited to:gastritis, pancreatitis, cholecystitis, choledocholithiasis       Problems Addressed:  Cholecystitis: acute illness or injury that poses a threat to life or bodily functions  Epigastric pain: acute illness or injury that poses a threat to life or bodily functions    Amount and/or Complexity of Data Reviewed  Labs: ordered.  Radiology: ordered and independent interpretation performed.  Discussion of management or test interpretation with external provider(s): General surgery - will admit     Risk  Prescription drug management.  Decision regarding hospitalization.            Scribe Attestation:   Scribe #1: I performed the above scribed service and the documentation accurately describes the services I performed. I attest to the accuracy of the note.    Attending Attestation:           Physician Attestation for Scribe:  Physician Attestation Statement for Scribe #1: I, Dr. Brent Frazier IV, MD, reviewed documentation, as scribed by Minh Perdomo in my presence, and it is both accurate and complete.             ED Course as of 08/23/24 7580   Fri Aug 23, 2024   2153 Paged General Surgery [GG]   2240 Discussed with General Surgery. [GG]   2241 Surgery will come evaluate [AC]   2321 Surgery will admit   [AC]      ED Course User Index  [AC] Brent Frazier IV, MD  [GG] Minh Perdomo                           Clinical Impression:  Final diagnoses:  [R10.13] Epigastric pain  [K81.9] Cholecystitis          ED Disposition Condition    Admit Stable                Brent Frazier IV, MD  08/23/24 7663

## 2024-08-24 NOTE — ANESTHESIA PROCEDURE NOTES
Intubation    Date/Time: 8/24/2024 3:21 PM    Performed by: Dasha Boyd CRNA  Authorized by: Derick Carmona MD    Intubation:     Induction:  Intravenous    Intubated:  Postinduction    Mask Ventilation:  Easy mask    Attempts:  1    Attempted By:  CRNA    Method of Intubation:  Video laryngoscopy    Blade:  Sterling 3    Laryngeal View Grade: Grade I - full view of cords      Difficult Airway Encountered?: No      Complications:  None    Airway Device:  Oral endotracheal tube    Airway Device Size:  7.5    Style/Cuff Inflation:  Cuffed    Inflation Amount (mL):  6    Tube secured:  21    Secured at:  The lips    Placement Verified By:  Colorimetric ETCO2 device    Complicating Factors:  None    Findings Post-Intubation:  BS equal bilateral

## 2024-08-25 VITALS
DIASTOLIC BLOOD PRESSURE: 69 MMHG | TEMPERATURE: 98 F | WEIGHT: 130 LBS | OXYGEN SATURATION: 98 % | SYSTOLIC BLOOD PRESSURE: 105 MMHG | BODY MASS INDEX: 22.2 KG/M2 | RESPIRATION RATE: 18 BRPM | HEIGHT: 64 IN | HEART RATE: 90 BPM

## 2024-08-25 LAB
OHS QRS DURATION: 82 MS
OHS QTC CALCULATION: 450 MS

## 2024-08-25 PROCEDURE — 25000003 PHARM REV CODE 250: Performed by: STUDENT IN AN ORGANIZED HEALTH CARE EDUCATION/TRAINING PROGRAM

## 2024-08-25 PROCEDURE — 96361 HYDRATE IV INFUSION ADD-ON: CPT

## 2024-08-25 PROCEDURE — 99024 POSTOP FOLLOW-UP VISIT: CPT | Mod: ,,, | Performed by: SURGERY

## 2024-08-25 PROCEDURE — 96376 TX/PRO/DX INJ SAME DRUG ADON: CPT

## 2024-08-25 PROCEDURE — G0378 HOSPITAL OBSERVATION PER HR: HCPCS

## 2024-08-25 PROCEDURE — 63600175 PHARM REV CODE 636 W HCPCS

## 2024-08-25 RX ORDER — OXYCODONE AND ACETAMINOPHEN 7.5; 325 MG/1; MG/1
1 TABLET ORAL EVERY 6 HOURS PRN
Qty: 8 TABLET | Refills: 0 | Status: SHIPPED | OUTPATIENT
Start: 2024-08-25 | End: 2024-08-30

## 2024-08-25 RX ORDER — ONDANSETRON 4 MG/1
4 TABLET, ORALLY DISINTEGRATING ORAL EVERY 8 HOURS PRN
Qty: 12 TABLET | Refills: 0 | Status: SHIPPED | OUTPATIENT
Start: 2024-08-25

## 2024-08-25 RX ADMIN — PROCHLORPERAZINE EDISYLATE 2.5 MG: 5 INJECTION INTRAMUSCULAR; INTRAVENOUS at 02:08

## 2024-08-25 RX ADMIN — OXYCODONE HYDROCHLORIDE 10 MG: 10 TABLET ORAL at 12:08

## 2024-08-25 RX ADMIN — ACETAMINOPHEN 325MG 650 MG: 325 TABLET ORAL at 05:08

## 2024-08-25 RX ADMIN — ACETAMINOPHEN 325MG 650 MG: 325 TABLET ORAL at 02:08

## 2024-08-25 RX ADMIN — OXYCODONE HYDROCHLORIDE 10 MG: 10 TABLET ORAL at 09:08

## 2024-08-25 RX ADMIN — SODIUM CHLORIDE, POTASSIUM CHLORIDE, SODIUM LACTATE AND CALCIUM CHLORIDE: 600; 310; 30; 20 INJECTION, SOLUTION INTRAVENOUS at 05:08

## 2024-08-25 NOTE — NURSING
Patient discharged home accompanied by family member. Discharge instructions given, pt verbalized understanding, questions answered. IV removed, VSS, pt in no apparent distress. [facility] acute care surgery to call pt with f/u appointment. Prescription medication sent to pt pharmacy. Needs met, pt satisfied with care.

## 2024-08-25 NOTE — NURSING
Nurses Note -- 4 Eyes      8/25/2024   10:17 AM      Skin assessed during: Q Shift Change      [x] No Altered Skin Integrity Present    []Prevention Measures Documented      [] Yes- Altered Skin Integrity Present or Discovered   [] LDA Added if Not in Epic (Describe Wound)   [] New Altered Skin Integrity was Present on Admit and Documented in LDA   [] Wound Image Taken    Wound Care Consulted? No    Attending Nurse:  Emerson Gramajo RN/Staff Member:  Cata

## 2024-08-27 ENCOUNTER — TELEPHONE (OUTPATIENT)
Dept: SURGERY | Facility: CLINIC | Age: 34
End: 2024-08-27
Payer: MEDICAID

## 2024-08-27 LAB — PSYCHE PATHOLOGY RESULT: NORMAL

## 2024-08-27 NOTE — TELEPHONE ENCOUNTER
----- Message from Mey Su RN sent at 8/26/2024 12:24 PM CDT -----  Regarding: FU  8/24 Rich Heard     Needs phone call on 9/10/24

## 2024-09-10 ENCOUNTER — DOCUMENTATION ONLY (OUTPATIENT)
Dept: SURGERY | Facility: CLINIC | Age: 34
End: 2024-09-10
Payer: MEDICAID

## 2024-09-10 NOTE — PROGRESS NOTES
Spoke with patient via telephone to follow up regarding her recent laparoscopic cholecystectomy by Dr. Larson on 8/24. Patient reports that she has been recovering well postoperatively. No n/v, fevers or chills. Tolerating regular diet. Has experienced some constipation recently but this is relieved by miralax, averaging one BM per day. No diarrhea. No abdominal pain at rest, but still sore after exertion around incision sites. Reports that her incisions appear to be healing well with mild residual swelling without redness or warmth. No drainage from surgical sites. No other questions or concerns at this time.     Surgical pathology results from 8/24 were shared with patient: chronic cholecystitis, focal mucosal cholesterolosis and one benign pericholecystic lymph node.      Lisandro Eagle M.D.  PGY-I, [employer] [facility]  General Surgery

## (undated) DEVICE — SOL IRRI STRL WATER 1000ML

## (undated) DEVICE — NDL INSUF ULTRA VERESS 120MM

## (undated) DEVICE — GLOVE PROTEXIS HYDROGEL SZ7.5

## (undated) DEVICE — SUT VICRYL+ 27 UR-6 VIOL

## (undated) DEVICE — NDL HYPO 22GX1 1/2 SYR 10ML LL

## (undated) DEVICE — BAG SPEC RETRV ENDO 4X6IN DISP

## (undated) DEVICE — ELECTRODE PATIENT RETURN DISP

## (undated) DEVICE — IRRIGATOR SUCTION W/TIP

## (undated) DEVICE — TROCAR ENDOPATH XCEL 5X100MM

## (undated) DEVICE — SCISSOR CURVED ENDOPATH 5MM

## (undated) DEVICE — KIT SURGICAL TURNOVER

## (undated) DEVICE — WARMER DRAPE STERILE LF

## (undated) DEVICE — BLADE SURG STAINLESS STEEL #11

## (undated) DEVICE — SUT VICRYL PLUS 4-0 FS-2 27IN

## (undated) DEVICE — CORD LAP 10 DISP

## (undated) DEVICE — KIT GEN LAPAROSCOPY LAFAYETTE

## (undated) DEVICE — ADHESIVE DERMABOND ADVANCED

## (undated) DEVICE — APPLIER CLIP ENDO MED/LG 10MM

## (undated) DEVICE — CANNULA ENDOPATH XCEL 5X100MM

## (undated) DEVICE — PASSER SUT SUREGRSP 10-12X15MM

## (undated) DEVICE — TROCAR ENDOPATH XCEL 11MM 10CM